# Patient Record
Sex: MALE | Race: BLACK OR AFRICAN AMERICAN | NOT HISPANIC OR LATINO | Employment: OTHER | URBAN - METROPOLITAN AREA
[De-identification: names, ages, dates, MRNs, and addresses within clinical notes are randomized per-mention and may not be internally consistent; named-entity substitution may affect disease eponyms.]

---

## 2023-08-21 PROBLEM — N20.0 KIDNEY STONES: Status: ACTIVE | Noted: 2023-08-21

## 2023-08-21 PROBLEM — M24.359 SPASTIC HIP DISLOCATION: Status: ACTIVE | Noted: 2023-08-21

## 2023-08-21 PROBLEM — G80.0 CP (CEREBRAL PALSY), SPASTIC, QUADRIPLEGIC (MULTI): Status: ACTIVE | Noted: 2023-08-21

## 2023-08-21 PROBLEM — M25.559 PAIN, HIP: Status: ACTIVE | Noted: 2023-08-21

## 2023-08-21 PROBLEM — G40.909 SEIZURE DISORDER (MULTI): Status: ACTIVE | Noted: 2023-08-21

## 2023-08-21 RX ORDER — DIAZEPAM 10 MG/2G
10 GEL RECTAL EVERY 6 HOURS PRN
COMMUNITY

## 2023-08-21 RX ORDER — VIT C/E/ZN/COPPR/LUTEIN/ZEAXAN 250MG-90MG
1000 CAPSULE ORAL
COMMUNITY
End: 2024-02-20 | Stop reason: ENTERED-IN-ERROR

## 2023-08-21 RX ORDER — ACETAMINOPHEN 650 MG/1
650 SUPPOSITORY RECTAL
COMMUNITY
End: 2024-02-20 | Stop reason: ENTERED-IN-ERROR

## 2023-08-21 RX ORDER — FOLIC ACID/MULTIVIT,IRON,MINER .4-18-35
1 TABLET,CHEWABLE ORAL DAILY
COMMUNITY
End: 2024-02-20 | Stop reason: ENTERED-IN-ERROR

## 2023-08-21 RX ORDER — PHENOBARBITAL 100 MG/1
1 TABLET ORAL 2 TIMES DAILY
COMMUNITY
Start: 2021-03-12 | End: 2023-10-24 | Stop reason: SDUPTHER

## 2023-08-21 RX ORDER — ALBUTEROL SULFATE 0.83 MG/ML
2.5 SOLUTION RESPIRATORY (INHALATION)
COMMUNITY
End: 2024-02-20 | Stop reason: ENTERED-IN-ERROR

## 2023-08-21 RX ORDER — BACLOFEN 20 MG/1
20 TABLET ORAL 4 TIMES DAILY
COMMUNITY

## 2023-08-21 RX ORDER — DOCUSATE SODIUM 50 MG/5ML
10 LIQUID ORAL 2 TIMES DAILY
COMMUNITY
Start: 2023-01-08 | End: 2024-02-20 | Stop reason: ENTERED-IN-ERROR

## 2023-08-21 RX ORDER — TRIPROLIDINE/PSEUDOEPHEDRINE 2.5MG-60MG
20 TABLET ORAL EVERY 6 HOURS PRN
COMMUNITY

## 2023-08-21 RX ORDER — BISACODYL 10 MG/1
10 SUPPOSITORY RECTAL
COMMUNITY

## 2023-08-21 RX ORDER — ALBUTEROL SULFATE 90 UG/1
2 AEROSOL, METERED RESPIRATORY (INHALATION) 4 TIMES DAILY PRN
COMMUNITY

## 2023-08-21 RX ORDER — PHENOBARBITAL 15 MG/1
15 TABLET ORAL 2 TIMES DAILY
COMMUNITY
Start: 2022-04-19 | End: 2023-10-24 | Stop reason: SDUPTHER

## 2023-08-21 RX ORDER — ACETAMINOPHEN 160 MG/5ML
160 SUSPENSION ORAL
COMMUNITY
End: 2024-02-20 | Stop reason: ENTERED-IN-ERROR

## 2023-08-21 RX ORDER — PNV NO.95/FERROUS FUM/FOLIC AC 28MG-0.8MG
TABLET ORAL
COMMUNITY
End: 2024-02-20 | Stop reason: ENTERED-IN-ERROR

## 2023-10-02 ENCOUNTER — OFFICE VISIT (OUTPATIENT)
Dept: UROLOGY | Facility: CLINIC | Age: 33
End: 2023-10-02
Payer: MEDICAID

## 2023-10-02 DIAGNOSIS — G40.909 NONINTRACTABLE EPILEPSY WITHOUT STATUS EPILEPTICUS, UNSPECIFIED EPILEPSY TYPE (MULTI): ICD-10-CM

## 2023-10-02 DIAGNOSIS — N21.0 BLADDER STONE: ICD-10-CM

## 2023-10-02 DIAGNOSIS — G80.0 CP (CEREBRAL PALSY), SPASTIC, QUADRIPLEGIC (MULTI): ICD-10-CM

## 2023-10-02 DIAGNOSIS — N20.0 NEPHROLITHIASIS: Primary | ICD-10-CM

## 2023-10-02 DIAGNOSIS — R53.2 FUNCTIONAL QUADRIPLEGIA (MULTI): ICD-10-CM

## 2023-10-02 PROCEDURE — 99214 OFFICE O/P EST MOD 30 MIN: CPT | Performed by: STUDENT IN AN ORGANIZED HEALTH CARE EDUCATION/TRAINING PROGRAM

## 2023-10-02 PROCEDURE — 1036F TOBACCO NON-USER: CPT | Performed by: STUDENT IN AN ORGANIZED HEALTH CARE EDUCATION/TRAINING PROGRAM

## 2023-10-02 NOTE — PROGRESS NOTES
Cory presents for concerns of bladder stones.  The patient’s EMR has been reviewed.  Lives in [place], [place].  Has lived in a Group Home for the last 8-9 years.  Accompanied by the Manager of the group home whom is the primary historian.   Previously evaluated by Dr. Hunt and Marii Eric.  Referred to me 2/2 concerns of forming bladder stones.     SUBJECTIVE:  HPI   Hx of cerebral palsy and nephrolithiasis, urinary incontinence and epilepsy. Daily medications include phenobarbital daily. Wears adult depends for his incontinence.     Per Caregiver/:  Reports the patient passed a small amount of gross hematuria.   Occurred about 10 days ago. Spontaneously resolved. Has not recurred since. Patient did not appear in any pain or discomfort. Denies any recent UTIs or fevers.    Last CT imaging ((May 2022) was personally reviewed showed bladder wall thickening, but no stones visualized.   Latest RBUS (05/11/23) showed possible non-obstructing stones and bladder stones.  Last Cr 0.67 (July 11, 2023)    Past medical, surgical, family and social history in the chart was reviewed and is accurate including any additions to what is in this HPI.    Review of Systems   Constitutional: denies any unintentional weight loss or change in strength.  Integumentary: denies any rashes or pruritus.  Eyes: denies any double vision or eye pain.  Ear/Nose/Mouth/Throat: denies any nosebleeds or gum bleeds.  Cardiovascular: denies any chest pain or syncope.  Respiratory: denies hemoptysis.  Gastrointestinal: denies nausea or vomiting.  Musculoskeletal: denies muscle cramping or weakness.  Neurologic: denies convulsions or seizures.  Hematologic/Lymphatic: denies bleeding tendencies.  Endocrine: denies heat/cold intolerance.  All other systems have been reviewed and are negative unless otherwise noted in the HPI.    OBJECTIVE:  There were no vitals taken for this visit.  Physical Exam   Constitutional: No obvious  distress.  Eyes: Non-injected conjunctiva, sclera clear, EOMI.  Ears/Nose/Mouth/Throat: No obvious drainage per ears or nose.  Cardiovascular: Extremities are warm and well perfused. No edema, cyanosis or pallor.  Respiratory: No audible wheezing/stridor; respirations do not appear labored.  Gastrointestinal: Abdomen soft, not distended.  Musculoskeletal: Normal ROM of extremities.  Skin: No obvious rashes or open sores.  Neurologic: Alert and oriented, CN 2-12 grossly intact.  Psychiatric: Answers questions appropriately with normal affect.  Hematologic/Lymphatic/Immunologic: No obvious bruises or sites of spontaneous bleeding.  Genitourinary: No CVA tenderness, bladder not palpable.     Labs and imaging personally reviewed:  Lab Results   Component Value Date    WBC 7.4 07/11/2023    HGB 14.4 07/11/2023    HCT 43.8 07/11/2023     07/11/2023    CHOL 187 07/20/2021    TRIG 40 07/20/2021    HDL 65.5 07/20/2021    ALT 13 07/11/2023    AST 11 07/11/2023     07/11/2023    K 4.2 07/11/2023     07/11/2023    CREATININE 0.67 07/11/2023    BUN 9 07/11/2023    CO2 33 (H) 07/11/2023    INR 1.2 (H) 12/29/2022    HGBA1C 5.6 07/11/2023     === 05/04/22 ===  CT ABDOMEN PELVIS WO IV CONTRAST  - Impression -  1. Bilateral nonobstructing renal calculi.  2. A large amount of rectal stool suggests a fecal impaction.  3. Sigmoid, rectosigmoid and rectal wall thickening may be present.  4. Generalized urinary bladder wall thickening.   === 05/11/23 ===  US RENAL COMPLETE  - Impression -  Nonobstructing right-sided nephrolithiasis measuring up to 5 mm.  Thick-walled appearance of the urinary bladder which contains  layering debris and stones.     ASSESSMENT & PLAN:  Problem List Items Addressed This Visit    None  Visit Diagnoses       Nephrolithiasis    -  Primary    Relevant Orders    CT abdomen pelvis wo IV contrast    Follow Up In Urology    Bladder stone        Relevant Orders    CT abdomen pelvis wo IV contrast     Follow Up In Urology     CT stone to assess bladder and kidney stone size and location  Per my personal review of the ultrasound there does not appear to be significant bladder stone burden, may be able to watch this. He is not having UTIs.   *Plan to RTC in a couple weeks with a CT scan prior to.   All questions were answered to the Caregiver's satisfaction.  Patient's Caregiver agrees with the plan and wishes to proceed.    Scribed for Dr. Paolo Delgadillo by Michael Lucas.  I, Dr. Paolo Delgadillo have personally reviewed and agreed with the information entered by the Virtual Scribe. 10/02/23, 3:20 PM

## 2023-10-05 ENCOUNTER — OFFICE VISIT (OUTPATIENT)
Dept: POST ACUTE CARE | Facility: EXTERNAL LOCATION | Age: 33
End: 2023-10-05
Payer: MEDICAID

## 2023-10-05 DIAGNOSIS — M24.352 SPASTIC DISLOCATION OF LEFT HIP: ICD-10-CM

## 2023-10-05 DIAGNOSIS — G80.0 CP (CEREBRAL PALSY), SPASTIC, QUADRIPLEGIC (MULTI): Primary | ICD-10-CM

## 2023-10-05 PROCEDURE — 1036F TOBACCO NON-USER: CPT | Performed by: ORTHOPAEDIC SURGERY

## 2023-10-05 PROCEDURE — 99213 OFFICE O/P EST LOW 20 MIN: CPT | Performed by: ORTHOPAEDIC SURGERY

## 2023-10-05 NOTE — PROGRESS NOTES
Cory is a 33-year-old male with a year history of cerebral palsy spastic quadriplegia who presents today as follow-up from his most recent left hip steroid injection done on February 9, 2022. He tolerated this very well and is still seeing pain relief from it. The staff reports that he has been comfortable sitting in his chair for longer (up to 2.5 hours), and tolerates both back and side-lying for longer periods of time except a recent period of discomfort on his R side with right side lying.     Getting some mods to his wheelchair soon.  Increased pain in right hip per report, although two caregivers have said he seems to be doing pretty well.  Using ibuprofen, Can tolerate about 2.5 hours in wheelchair.    Past Medical History:   Diagnosis Date    Other conditions influencing health status     Pneumonia     No past surgical history on file.    Current Outpatient Medications:     acetaminophen (Tylenol) 650 mg suppository, Insert 1 suppository (650 mg) into the rectum., Disp: , Rfl:     acetaminophen 160 mg/5 mL (5 mL) suspension, Take 5 mL (160 mg) by mouth., Disp: , Rfl:     albuterol 2.5 mg /3 mL (0.083 %) nebulizer solution, Inhale 3 mL (2.5 mg)., Disp: , Rfl:     albuterol 90 mcg/actuation inhaler, Inhale 2 puffs 4 times a day as needed., Disp: , Rfl:     baclofen (Lioresal) 20 mg tablet, Take 1 tablet (20 mg) by mouth., Disp: , Rfl:     bisacodyl (Dulcolax) 10 mg suppository, Insert 1 suppository (10 mg) into the rectum., Disp: , Rfl:     calcium carbonate-vitamin D3 (Oscal-500) 500 mg-10 mcg (400 unit) tablet, Take by mouth. [facility] Calcium 500+D 500-400mg, Disp: , Rfl:     cholecalciferol (Vitamin D-3) 25 MCG (1000 UT) capsule, Take 1 capsule (25 mcg) by mouth., Disp: , Rfl:     diazePAM (Diastat AcuDiaL) 5-7.5-10 mg rectal kit, Insert into the rectum. 5-7.5-10mg rectal kit, Disp: , Rfl:     docusate sodium (Colace) 50 mg/5 mL oral liquid, Take 10 mL (100 mg) by mouth 2 times a day., Disp: , Rfl:      ibuprofen 100 mg/5 mL suspension, Take 5 mL (100 mg) by mouth., Disp: , Rfl:     multivit-min-iron-folic-vit K1 (Centrum Chewables) 8 mg-400 mcg- 10 mcg tablet,chewable, 1 tablet by peg tube route once daily., Disp: , Rfl:     multivitamin/iron/folic acid (CENTRUM ORAL), Take by mouth. LIQUID, Disp: , Rfl:     omeprazole (PriLOSEC) 2 mg/mL solution, Take 1 mL (2 mg) by mouth., Disp: , Rfl:     PHENobarbitaL (Luminal) 100 mg tablet, Take 1 tablet (100 mg) by mouth 2 times a day., Disp: , Rfl:     PHENobarbitaL (Luminal) 15 mg tablet, Take 1 tablet (15 mg) by mouth 2 times a day. Take one tablet twice daily along with 100mg BID, Disp: , Rfl:     polyethylene glycol 3350 (MIRALAX ORAL), 15 mL by peg tube route twice a day., Disp: , Rfl:     sodium chloride (Ayr) 0.65 % nasal drops, Administer into affected nostril(s). 0.65%SOLN, Disp: , Rfl:   No Known Allergies  Family History   Problem Relation Name Age of Onset    No Known Problems Mother       Social History     Socioeconomic History    Marital status: Single     Spouse name: Not on file    Number of children: Not on file    Years of education: Not on file    Highest education level: Not on file   Occupational History    Not on file   Tobacco Use    Smoking status: Never    Smokeless tobacco: Never   Substance and Sexual Activity    Alcohol use: Not on file    Drug use: Not on file    Sexual activity: Not on file   Other Topics Concern    Not on file   Social History Narrative    Not on file     Social Determinants of Health     Financial Resource Strain: Not on file   Food Insecurity: Not on file   Transportation Needs: Not on file   Physical Activity: Not on file   Stress: Not on file   Social Connections: Not on file   Intimate Partner Violence: Not on file   Housing Stability: Not on file       Review of Systems: 16 systems reviewed with the patient and family.  All systems were negative except for the pertinent positives noted in the history above.    General:  Well developed, well nourished male in no acute distress.    Skin: The skin is intact with no evidence of abrasions, bruises, or swelling.    Vascular: There are 2+ pulses in both lower extremities and brisk capillary refill.    Neuro: The light touch sensation is intact in both legs.    The left hip can be moved into some minimal abduction - only about 10 total degrees (more on the right) and he seems to have no pain. He has almost no hip flexion on the left, but can be fully extended. He also allows adduction, IR and ER on both sides, with no obvious pain today. His knees and ankles are stiff with very limited mobility. His spine is straight, I could abduct his shoulders to neutral and he kept his elbows pretty tightly held in some flexion. He was concerned about moving his arms today.  His spine is straight.     Assesment: Cory Cobian is a 33 y.o. male who has CPSQ and a history of hip arthritis.  At this time we will continue with observation. The hip steroid injection still seems to be effective. We can repeat it at anytime in the future, but we will plan for one year follow up with no Xrays at this time.

## 2023-10-24 ENCOUNTER — LAB (OUTPATIENT)
Dept: LAB | Facility: LAB | Age: 33
End: 2023-10-24
Payer: MEDICAID

## 2023-10-24 ENCOUNTER — OFFICE VISIT (OUTPATIENT)
Dept: NEUROLOGY | Facility: CLINIC | Age: 33
End: 2023-10-24
Payer: MEDICAID

## 2023-10-24 VITALS — HEART RATE: 108 BPM | DIASTOLIC BLOOD PRESSURE: 84 MMHG | RESPIRATION RATE: 18 BRPM | SYSTOLIC BLOOD PRESSURE: 133 MMHG

## 2023-10-24 DIAGNOSIS — G40.909 SEIZURE DISORDER (MULTI): ICD-10-CM

## 2023-10-24 DIAGNOSIS — G40.909 SEIZURE DISORDER (MULTI): Primary | ICD-10-CM

## 2023-10-24 LAB — PHENOBARB SERPL-MCNC: 46 UG/ML (ref 10–40)

## 2023-10-24 PROCEDURE — 80184 ASSAY OF PHENOBARBITAL: CPT

## 2023-10-24 PROCEDURE — 1036F TOBACCO NON-USER: CPT | Performed by: NURSE PRACTITIONER

## 2023-10-24 PROCEDURE — 36415 COLL VENOUS BLD VENIPUNCTURE: CPT

## 2023-10-24 PROCEDURE — 99214 OFFICE O/P EST MOD 30 MIN: CPT | Performed by: NURSE PRACTITIONER

## 2023-10-24 PROCEDURE — 99214 OFFICE O/P EST MOD 30 MIN: CPT | Mod: PO | Performed by: NURSE PRACTITIONER

## 2023-10-24 RX ORDER — PHENOBARBITAL 100 MG/1
100 TABLET ORAL 2 TIMES DAILY
Qty: 60 TABLET | Refills: 5 | Status: SHIPPED | OUTPATIENT
Start: 2023-10-24 | End: 2024-04-21

## 2023-10-24 RX ORDER — PHENOBARBITAL 15 MG/1
15 TABLET ORAL 2 TIMES DAILY
Qty: 60 TABLET | Refills: 5 | Status: SHIPPED | OUTPATIENT
Start: 2023-10-24 | End: 2024-04-21

## 2023-10-24 ASSESSMENT — PAIN SCALES - GENERAL: PAINLEVEL: 0-NO PAIN

## 2023-10-24 NOTE — PATIENT INSTRUCTIONS
"Thank you for coming to the Ep[facility]psy Clinic today.  -If you have any sudden new, concerning or worsening symptoms, call 911 and go to the Emergency Room. Otherwise, it was good seeing you today-    -Please follow seizure precautions:   Please do not drive, operate any heavy machinery, swim unsupervised, please shower without collection of water instead of bathe. Be cautious around hot, heavy, or sharp objects. Do not cook with an open flame and do not perform any activities at heights such as on a ladder. These precautions should stay in place until 6 months seizure free and cleared by a provider.    -HOW TO CONTACT JOHNNY LOPEZ EPILEPSY NURSE PRACTITIONER ([phone]).   Instructed to call in the event of seizure, medication refills, or any questions  *Please allow 24-48 hours for non-urgent responses*.  For emergency concerns, please dial 911 or present to the nearest emergency room.  For concerns after business hours (8am-4:30pm) or on weekends please call [phone]  To call and schedule a follow up appointment please call [phone]  -Paperwork may take up to 3 business days to complete-    Every attempt is made to run on time for your appointment, if you are 15 minutes or later for your appoinement you may be asked to reschedule    -Compliance education: It is important to continue to try and achieve seizure control because of the potential for injury and illness due to seizures. In a very small minority of patients with generalized tonic clonic seizures (\"grand mal\"), breathing or heart function can stop during a seizure and result in demise (sudden unexpected death in epilepsy or SUDEP). [place] from seizures prevents this kind of outcome-     I have ordered blood work for you to have completed at the lab. You do not need to bring any paperwork with you if you are going to a [facility]. The results will automatically come to me and I will call or message you with results.        "

## 2023-10-24 NOTE — ASSESSMENT & PLAN NOTE
Continue PB 100mg(97.2) + 15mg twice daily   PB level q6 months- recurring order in system   RTC 12 months

## 2023-10-24 NOTE — PROGRESS NOTES
Patient ID: Cory Cobian 33 y.o.male presenting in follow-up for epilepsy. severe cognitive impairment due to cerebral palsy, spastic quadriplegia, seizure disorders who presented to our clinic today for 6 month follow-up.     HPI    Seizure History:  In March he had a cluster of abnormal movements (bilateral leg shakings) in one day concerning for seizures even though it was not his typical ones. He was brought to hospital and was given Diastat, Keppra on route.   We monitored him 24 hrs and there were no epileptiform discharges or seizures. He was discharged and we increased Phenobarbital from 100/100 to 113.4/113.4 mg (liquid formation) mainly for precautions as his events were not really sure for epileptic phenomenon.   In April, he had total 6 seizures, one of them lasted more than 30 minutes so Diastat was given.   In May, until now he already had 3 seizures, the first and second one lasted 10 to 15 minutes and the last one few days ago lasted about 30s.   The nurse didn't witness all his seizures, mentioning that most of them were his typical ones but some of them were just some regular shaking like behavior issues.   Overall, his seizures/events are more frequent than last year and the year before.   There were no clear trigger factors like fevers, infections, change of medications, sleep deprive noticed.            PRESENT CONCERNS:    He is accompanied by a health aide from his group home. Since his last visit he has had no seizures. His last seizures were in July 2021. Prior seizures were described as RUE shaking for 30 seconds- 1 minute. He has not needed any rescue diastat. Overall he is tolerating the increased dose of PB 115mg bid.. There are no reports of staring spells, memory lapses, waking up in the morning with bite marks on tongue or cheek, or unexplained bruises, or unexplained urinary incontinence upon awakening. Had covid in January but recovering well.      RECENT PB LEVELS:  01/2023:  45.5  4/2023: 38.8      Review of Systems   All other systems reviewed and are negative.          RESULTS:  No EEG results found for the past 12 months    No results found for this or any previous visit (from the past 4464 hour(s)).        No MRI head results found for the past 12 months    No CT head results found for the past 12 months    No results found for this or any previous visit (from the past 4464 hour(s)).    No results found for this or any previous visit.        Vitals:    10/24/23 1052   BP: 133/84   Pulse: 108   Resp: 18       Neurologic Exam   non verbal   limited exam due to severe cognitive impairment and spastic quadriplegia   wheel chair bound        ASSESSMENT & PLAN:   33 y.o. male presenting in follow-up for peviously diagnosed epilepsy. Semiology as described above    Problem List Items Addressed This Visit       Seizure disorder (CMS/[facility]) - Primary     Continue PB 100mg(97.2) + 15mg twice daily   PB level q6 months- recurring order in system   RTC 12 months            Relevant Medications    PHENobarbitaL (Luminal) 100 mg tablet    PHENobarbitaL (Luminal) 15 mg tablet    Other Relevant Orders    Phenobarbital

## 2023-11-02 ENCOUNTER — HOSPITAL ENCOUNTER (OUTPATIENT)
Dept: RADIOLOGY | Facility: HOSPITAL | Age: 33
Discharge: HOME | End: 2023-11-02
Payer: MEDICAID

## 2023-11-02 DIAGNOSIS — N20.0 NEPHROLITHIASIS: ICD-10-CM

## 2023-11-02 DIAGNOSIS — N21.0 BLADDER STONE: ICD-10-CM

## 2023-11-02 PROCEDURE — 74176 CT ABD & PELVIS W/O CONTRAST: CPT

## 2023-11-02 PROCEDURE — 74176 CT ABD & PELVIS W/O CONTRAST: CPT | Performed by: STUDENT IN AN ORGANIZED HEALTH CARE EDUCATION/TRAINING PROGRAM

## 2023-12-05 ENCOUNTER — OFFICE VISIT (OUTPATIENT)
Dept: UROLOGY | Facility: CLINIC | Age: 33
End: 2023-12-05
Payer: MEDICAID

## 2023-12-05 VITALS — TEMPERATURE: 97.2 F | WEIGHT: 97 LBS | BODY MASS INDEX: 18.94 KG/M2

## 2023-12-05 DIAGNOSIS — N20.0 KIDNEY STONES: Primary | ICD-10-CM

## 2023-12-05 PROCEDURE — 99214 OFFICE O/P EST MOD 30 MIN: CPT | Performed by: STUDENT IN AN ORGANIZED HEALTH CARE EDUCATION/TRAINING PROGRAM

## 2023-12-05 PROCEDURE — 1036F TOBACCO NON-USER: CPT | Performed by: STUDENT IN AN ORGANIZED HEALTH CARE EDUCATION/TRAINING PROGRAM

## 2023-12-05 NOTE — PROGRESS NOTES
Cory presents for a follow up visit and CT results.  The patient’s EMR has been reviewed.  Lives in [place], [place].  Has lived in a Group Home for the last 8-9 years.  Accompanied by the Manager of the group home whom is the primary historian.   Previously evaluated by Dr. Hunt and Marii Eric.  Referred to me 2/2 concerns of forming bladder stones.     Hx of cerebral palsy and nephrolithiasis, urinary incontinence and epilepsy.   Daily medications include phenobarbital daily.   Wears adult depends for his incontinence.     Recent CT A&P personally reviewed.  1. Multiple bilateral nonobstructing renal calculi measuring up to 8  mm in the left inferior pole. No hydronephrosis.  2. Rectal dilation and extensive colonic stool.    SUBJECTIVE: HPI   TODAY (12/05/23)  Per Caregiver/.  Patient has been doing well overall.  No acute or worsening complaints.   He does not appear to be in any discomfort.   Latest CT results reviewed with Caregiver today.     TO REVIEW: Initial evaluation (10/02/23)  Per Caregiver/:  Reports the patient passed a small amount of gross hematuria.   Occurred about 10 days ago.   Spontaneously resolved.   Has not recurred since.   Patient did not appear in any pain or discomfort.   Denies any recent UTIs or fevers.     Last CT imaging ((May 2022) was personally reviewed showed:  - bladder wall thickening, but no stones visualized.     Latest RBUS (05/11/23) showed:  - possible non-obstructing stones and bladder stones.    Last Cr 0.67 (July 11, 2023)    Past medical, surgical, family and social history in the chart was reviewed and is accurate including any additions to what is in this HPI.    Review of Systems   Constitutional: denies any unintentional weight loss or change in strength.  Integumentary: denies any rashes or pruritus.  Eyes: denies any double vision or eye pain.  Ear/Nose/Mouth/Throat: denies any nosebleeds or gum bleeds.  Cardiovascular:  denies any chest pain or syncope.  Respiratory: denies hemoptysis.  Gastrointestinal: denies nausea or vomiting.  Musculoskeletal: denies muscle cramping or weakness.  Neurologic: denies convulsions or seizures.  Hematologic/Lymphatic: denies bleeding tendencies.  Endocrine: denies heat/cold intolerance.  All other systems have been reviewed and are negative unless otherwise noted in the HPI.    OBJECTIVE:  Visit Vitals  Temp 36.2 °C (97.2 °F)     Physical Exam   Constitutional: No obvious distress.  Eyes: Non-injected conjunctiva, sclera clear, EOMI.  Ears/Nose/Mouth/Throat: No obvious drainage per ears or nose.  Cardiovascular: Extremities are warm and well perfused. No edema, cyanosis or pallor.  Respiratory: No audible wheezing/stridor; respirations do not appear labored.  Gastrointestinal: Abdomen soft, not distended.  Musculoskeletal: Normal ROM of extremities.  Skin: No obvious rashes or open sores.  Neurologic: Alert and oriented, CN 2-12 grossly intact.  Psychiatric: Answers questions appropriately with normal affect.  Hematologic/Lymphatic/Immunologic: No obvious bruises or sites of spontaneous bleeding.  Genitourinary: No CVA tenderness, bladder not palpable.     Labs and imaging:  Lab Results   Component Value Date    WBC 7.4 07/11/2023    HGB 14.4 07/11/2023    HCT 43.8 07/11/2023     07/11/2023    CHOL 187 07/20/2021    TRIG 40 07/20/2021    HDL 65.5 07/20/2021    ALT 13 07/11/2023    AST 11 07/11/2023     07/11/2023    K 4.2 07/11/2023     07/11/2023    CREATININE 0.67 07/11/2023    BUN 9 07/11/2023    CO2 33 (H) 07/11/2023    INR 1.2 (H) 12/29/2022    HGBA1C 5.6 07/11/2023     ASSESSMENT:  Problem List Items Addressed This Visit    None     PLAN:  CT results reviewed with Caregiver.   Current renal stones may be too large to pass.   Suspect he will require surgical intervention eventually for removal.   Discussed options, will continue with stone surveillance for now.  Advised to  call if he becomes acutely symptomatic.   RTC in 3 months with a KUB prior to.     Patient appears to be significantly constipated and impacted.  Advised that this needs to be addressed prior to being considered for surgery.   Encouraged to follow up with PCP for this.     All questions were answered to the patient’s satisfaction.  Patient agrees with the plan and wishes to proceed.    Scribed for Dr. Paolo Delgadillo by Michael Lucas.  I, Dr. Paolo Delgadillo have personally reviewed and agreed with the information entered by the Virtual Scribe. 12/05/23.

## 2023-12-14 ENCOUNTER — HOSPITAL ENCOUNTER (OUTPATIENT)
Dept: RADIOLOGY | Facility: HOSPITAL | Age: 33
Discharge: HOME | End: 2023-12-14
Payer: MEDICAID

## 2023-12-14 DIAGNOSIS — N20.0 KIDNEY STONES: ICD-10-CM

## 2023-12-14 PROCEDURE — 74018 RADEX ABDOMEN 1 VIEW: CPT

## 2023-12-14 PROCEDURE — 74018 RADEX ABDOMEN 1 VIEW: CPT | Performed by: RADIOLOGY

## 2024-02-06 ENCOUNTER — LAB REQUISITION (OUTPATIENT)
Dept: LAB | Facility: HOSPITAL | Age: 34
End: 2024-02-06
Payer: MEDICAID

## 2024-02-06 DIAGNOSIS — Z79.899 OTHER LONG TERM (CURRENT) DRUG THERAPY: ICD-10-CM

## 2024-02-06 DIAGNOSIS — Z51.81 ENCOUNTER FOR THERAPEUTIC DRUG LEVEL MONITORING: ICD-10-CM

## 2024-02-06 DIAGNOSIS — E55.9 VITAMIN D DEFICIENCY, UNSPECIFIED: ICD-10-CM

## 2024-02-06 LAB
ALBUMIN SERPL BCP-MCNC: 4 G/DL (ref 3.4–5)
ALP SERPL-CCNC: 108 U/L (ref 33–120)
ALT SERPL W P-5'-P-CCNC: 13 U/L (ref 10–52)
AST SERPL W P-5'-P-CCNC: 11 U/L (ref 9–39)
BILIRUB DIRECT SERPL-MCNC: 0.1 MG/DL (ref 0–0.3)
BILIRUB SERPL-MCNC: 0.3 MG/DL (ref 0–1.2)
EST. AVERAGE GLUCOSE BLD GHB EST-MCNC: 111 MG/DL
HBA1C MFR BLD: 5.5 %
PHENOBARB SERPL-MCNC: 36.2 UG/ML (ref 10–40)
PROT SERPL-MCNC: 7.1 G/DL (ref 6.4–8.2)

## 2024-02-06 PROCEDURE — 80184 ASSAY OF PHENOBARBITAL: CPT | Mod: OUT | Performed by: HOSPITALIST

## 2024-02-06 PROCEDURE — 83036 HEMOGLOBIN GLYCOSYLATED A1C: CPT | Mod: OUT | Performed by: HOSPITALIST

## 2024-02-06 PROCEDURE — 80076 HEPATIC FUNCTION PANEL: CPT | Mod: OUT | Performed by: HOSPITALIST

## 2024-02-20 ENCOUNTER — HOSPITAL ENCOUNTER (INPATIENT)
Facility: HOSPITAL | Age: 34
LOS: 2 days | Discharge: HOME | End: 2024-02-22
Attending: GENERAL PRACTICE | Admitting: FAMILY MEDICINE
Payer: MEDICAID

## 2024-02-20 ENCOUNTER — APPOINTMENT (OUTPATIENT)
Dept: RADIOLOGY | Facility: HOSPITAL | Age: 34
End: 2024-02-20
Payer: MEDICAID

## 2024-02-20 ENCOUNTER — APPOINTMENT (OUTPATIENT)
Dept: CARDIOLOGY | Facility: HOSPITAL | Age: 34
End: 2024-02-20
Payer: MEDICAID

## 2024-02-20 DIAGNOSIS — J10.1 INFLUENZA A: Primary | ICD-10-CM

## 2024-02-20 LAB
ALBUMIN SERPL BCP-MCNC: 4.6 G/DL (ref 3.4–5)
ALP SERPL-CCNC: 128 U/L (ref 33–120)
ALT SERPL W P-5'-P-CCNC: 19 U/L (ref 10–52)
AMORPH CRY #/AREA UR COMP ASSIST: ABNORMAL /HPF
ANION GAP SERPL CALC-SCNC: 12 MMOL/L (ref 10–20)
APPEARANCE UR: ABNORMAL
AST SERPL W P-5'-P-CCNC: 16 U/L (ref 9–39)
BACTERIA #/AREA URNS AUTO: ABNORMAL /HPF
BASOPHILS # BLD AUTO: 0.04 X10*3/UL (ref 0–0.1)
BASOPHILS NFR BLD AUTO: 0.5 %
BILIRUB SERPL-MCNC: 0.2 MG/DL (ref 0–1.2)
BILIRUB UR STRIP.AUTO-MCNC: NEGATIVE MG/DL
BUN SERPL-MCNC: 7 MG/DL (ref 6–23)
CALCIUM SERPL-MCNC: 9.1 MG/DL (ref 8.6–10.3)
CHLORIDE SERPL-SCNC: 99 MMOL/L (ref 98–107)
CO2 SERPL-SCNC: 31 MMOL/L (ref 21–32)
COLOR UR: YELLOW
CREAT SERPL-MCNC: 0.64 MG/DL (ref 0.5–1.3)
EGFRCR SERPLBLD CKD-EPI 2021: >90 ML/MIN/1.73M*2
EOSINOPHIL # BLD AUTO: 0.12 X10*3/UL (ref 0–0.7)
EOSINOPHIL NFR BLD AUTO: 1.4 %
ERYTHROCYTE [DISTWIDTH] IN BLOOD BY AUTOMATED COUNT: 12.3 % (ref 11.5–14.5)
FLUAV RNA RESP QL NAA+PROBE: DETECTED
FLUBV RNA RESP QL NAA+PROBE: NOT DETECTED
GLUCOSE SERPL-MCNC: 115 MG/DL (ref 74–99)
GLUCOSE UR STRIP.AUTO-MCNC: NEGATIVE MG/DL
HCT VFR BLD AUTO: 46.3 % (ref 41–52)
HGB BLD-MCNC: 15.3 G/DL (ref 13.5–17.5)
IMM GRANULOCYTES # BLD AUTO: 0.04 X10*3/UL (ref 0–0.7)
IMM GRANULOCYTES NFR BLD AUTO: 0.5 % (ref 0–0.9)
KETONES UR STRIP.AUTO-MCNC: NEGATIVE MG/DL
LACTATE SERPL-SCNC: 1.6 MMOL/L (ref 0.4–2)
LACTATE SERPL-SCNC: 2.5 MMOL/L (ref 0.4–2)
LEUKOCYTE ESTERASE UR QL STRIP.AUTO: NEGATIVE
LYMPHOCYTES # BLD AUTO: 0.99 X10*3/UL (ref 1.2–4.8)
LYMPHOCYTES NFR BLD AUTO: 11.2 %
MCH RBC QN AUTO: 31.7 PG (ref 26–34)
MCHC RBC AUTO-ENTMCNC: 33 G/DL (ref 32–36)
MCV RBC AUTO: 96 FL (ref 80–100)
MONOCYTES # BLD AUTO: 1.14 X10*3/UL (ref 0.1–1)
MONOCYTES NFR BLD AUTO: 12.9 %
MUCOUS THREADS #/AREA URNS AUTO: ABNORMAL /LPF
NEUTROPHILS # BLD AUTO: 6.54 X10*3/UL (ref 1.2–7.7)
NEUTROPHILS NFR BLD AUTO: 73.5 %
NITRITE UR QL STRIP.AUTO: NEGATIVE
NRBC BLD-RTO: 0 /100 WBCS (ref 0–0)
PH UR STRIP.AUTO: 7 [PH]
PLATELET # BLD AUTO: 272 X10*3/UL (ref 150–450)
POTASSIUM SERPL-SCNC: 3.7 MMOL/L (ref 3.5–5.3)
PROT SERPL-MCNC: 8 G/DL (ref 6.4–8.2)
PROT UR STRIP.AUTO-MCNC: ABNORMAL MG/DL
RBC # BLD AUTO: 4.83 X10*6/UL (ref 4.5–5.9)
RBC # UR STRIP.AUTO: ABNORMAL /UL
RBC #/AREA URNS AUTO: >20 /HPF
SARS-COV-2 RNA RESP QL NAA+PROBE: NOT DETECTED
SODIUM SERPL-SCNC: 138 MMOL/L (ref 136–145)
SP GR UR STRIP.AUTO: 1.01
UROBILINOGEN UR STRIP.AUTO-MCNC: <2 MG/DL
WBC # BLD AUTO: 8.9 X10*3/UL (ref 4.4–11.3)
WBC #/AREA URNS AUTO: ABNORMAL /HPF

## 2024-02-20 PROCEDURE — 87636 SARSCOV2 & INF A&B AMP PRB: CPT | Performed by: GENERAL PRACTICE

## 2024-02-20 PROCEDURE — 71046 X-RAY EXAM CHEST 2 VIEWS: CPT | Mod: FOREIGN READ | Performed by: RADIOLOGY

## 2024-02-20 PROCEDURE — 93005 ELECTROCARDIOGRAM TRACING: CPT

## 2024-02-20 PROCEDURE — 85025 COMPLETE CBC W/AUTO DIFF WBC: CPT | Performed by: GENERAL PRACTICE

## 2024-02-20 PROCEDURE — 1100000001 HC PRIVATE ROOM DAILY

## 2024-02-20 PROCEDURE — 81001 URINALYSIS AUTO W/SCOPE: CPT | Performed by: GENERAL PRACTICE

## 2024-02-20 PROCEDURE — 96360 HYDRATION IV INFUSION INIT: CPT

## 2024-02-20 PROCEDURE — 2500000004 HC RX 250 GENERAL PHARMACY W/ HCPCS (ALT 636 FOR OP/ED): Performed by: GENERAL PRACTICE

## 2024-02-20 PROCEDURE — 71046 X-RAY EXAM CHEST 2 VIEWS: CPT

## 2024-02-20 PROCEDURE — 36415 COLL VENOUS BLD VENIPUNCTURE: CPT | Performed by: GENERAL PRACTICE

## 2024-02-20 PROCEDURE — 2500000001 HC RX 250 WO HCPCS SELF ADMINISTERED DRUGS (ALT 637 FOR MEDICARE OP): Performed by: GENERAL PRACTICE

## 2024-02-20 PROCEDURE — 2500000001 HC RX 250 WO HCPCS SELF ADMINISTERED DRUGS (ALT 637 FOR MEDICARE OP): Performed by: FAMILY MEDICINE

## 2024-02-20 PROCEDURE — 2500000004 HC RX 250 GENERAL PHARMACY W/ HCPCS (ALT 636 FOR OP/ED): Performed by: FAMILY MEDICINE

## 2024-02-20 PROCEDURE — 83605 ASSAY OF LACTIC ACID: CPT | Performed by: GENERAL PRACTICE

## 2024-02-20 PROCEDURE — 99285 EMERGENCY DEPT VISIT HI MDM: CPT | Mod: 25

## 2024-02-20 PROCEDURE — 80053 COMPREHEN METABOLIC PANEL: CPT | Performed by: GENERAL PRACTICE

## 2024-02-20 PROCEDURE — 2500000002 HC RX 250 W HCPCS SELF ADMINISTERED DRUGS (ALT 637 FOR MEDICARE OP, ALT 636 FOR OP/ED): Performed by: FAMILY MEDICINE

## 2024-02-20 RX ORDER — FERROUS SULFATE, DRIED 160(50) MG
1 TABLET, EXTENDED RELEASE ORAL DAILY
COMMUNITY

## 2024-02-20 RX ORDER — ALBUTEROL SULFATE 90 UG/1
2 AEROSOL, METERED RESPIRATORY (INHALATION) 4 TIMES DAILY PRN
Status: DISCONTINUED | OUTPATIENT
Start: 2024-02-20 | End: 2024-02-20 | Stop reason: CLARIF

## 2024-02-20 RX ORDER — TRIPROLIDINE/PSEUDOEPHEDRINE 2.5MG-60MG
400 TABLET ORAL EVERY 6 HOURS PRN
Status: DISCONTINUED | OUTPATIENT
Start: 2024-02-20 | End: 2024-02-22 | Stop reason: HOSPADM

## 2024-02-20 RX ORDER — SODIUM CHLORIDE, SODIUM LACTATE, POTASSIUM CHLORIDE, CALCIUM CHLORIDE 600; 310; 30; 20 MG/100ML; MG/100ML; MG/100ML; MG/100ML
100 INJECTION, SOLUTION INTRAVENOUS CONTINUOUS
Status: ACTIVE | OUTPATIENT
Start: 2024-02-20 | End: 2024-02-21

## 2024-02-20 RX ORDER — BISACODYL 10 MG/1
10 SUPPOSITORY RECTAL DAILY
Status: DISCONTINUED | OUTPATIENT
Start: 2024-02-20 | End: 2024-02-22 | Stop reason: HOSPADM

## 2024-02-20 RX ORDER — BACLOFEN 10 MG/1
20 TABLET ORAL 4 TIMES DAILY
Status: DISCONTINUED | OUTPATIENT
Start: 2024-02-20 | End: 2024-02-22 | Stop reason: HOSPADM

## 2024-02-20 RX ORDER — PHENOBARBITAL 32.4 MG/1
100 TABLET ORAL 2 TIMES DAILY
Status: DISCONTINUED | OUTPATIENT
Start: 2024-02-20 | End: 2024-02-20

## 2024-02-20 RX ORDER — FAMOTIDINE 20 MG/1
20 TABLET, FILM COATED ORAL 2 TIMES DAILY
Status: DISCONTINUED | OUTPATIENT
Start: 2024-02-20 | End: 2024-02-22 | Stop reason: HOSPADM

## 2024-02-20 RX ORDER — FAMOTIDINE 10 MG/ML
20 INJECTION INTRAVENOUS 2 TIMES DAILY
Status: DISCONTINUED | OUTPATIENT
Start: 2024-02-20 | End: 2024-02-22 | Stop reason: HOSPADM

## 2024-02-20 RX ORDER — PHENOBARBITAL 32.4 MG/1
115 TABLET ORAL 2 TIMES DAILY
Status: DISCONTINUED | OUTPATIENT
Start: 2024-02-21 | End: 2024-02-22 | Stop reason: HOSPADM

## 2024-02-20 RX ORDER — POLYETHYLENE GLYCOL 3350 17 G/17G
17 POWDER, FOR SOLUTION ORAL DAILY
Status: DISCONTINUED | OUTPATIENT
Start: 2024-02-20 | End: 2024-02-22 | Stop reason: HOSPADM

## 2024-02-20 RX ORDER — PHENOBARBITAL 32.4 MG/1
15 TABLET ORAL 2 TIMES DAILY
Status: DISCONTINUED | OUTPATIENT
Start: 2024-02-20 | End: 2024-02-20

## 2024-02-20 RX ORDER — FERROUS SULFATE, DRIED 160(50) MG
1 TABLET, EXTENDED RELEASE ORAL DAILY
Status: DISCONTINUED | OUTPATIENT
Start: 2024-02-20 | End: 2024-02-22 | Stop reason: HOSPADM

## 2024-02-20 RX ORDER — ACETAMINOPHEN 325 MG/1
650 TABLET ORAL EVERY 4 HOURS PRN
COMMUNITY

## 2024-02-20 RX ORDER — MULTIVIT-MIN/FERROUS GLUCONATE 9 MG/15 ML
15 LIQUID (ML) ORAL DAILY
Status: DISCONTINUED | OUTPATIENT
Start: 2024-02-21 | End: 2024-02-22 | Stop reason: HOSPADM

## 2024-02-20 RX ORDER — DIAZEPAM 10 MG/2G
10 GEL RECTAL EVERY 6 HOURS PRN
Status: DISCONTINUED | OUTPATIENT
Start: 2024-02-20 | End: 2024-02-20

## 2024-02-20 RX ORDER — SIMETHICONE 80 MG
125 TABLET,CHEWABLE ORAL EVERY 6 HOURS PRN
Status: DISCONTINUED | OUTPATIENT
Start: 2024-02-20 | End: 2024-02-22 | Stop reason: HOSPADM

## 2024-02-20 RX ORDER — ENOXAPARIN SODIUM 100 MG/ML
40 INJECTION SUBCUTANEOUS EVERY 24 HOURS
Status: DISCONTINUED | OUTPATIENT
Start: 2024-02-20 | End: 2024-02-22 | Stop reason: HOSPADM

## 2024-02-20 RX ORDER — ACETAMINOPHEN 160 MG/5ML
650 SOLUTION ORAL ONCE
Status: COMPLETED | OUTPATIENT
Start: 2024-02-20 | End: 2024-02-20

## 2024-02-20 RX ORDER — ALBUTEROL SULFATE 0.83 MG/ML
2.5 SOLUTION RESPIRATORY (INHALATION) EVERY 6 HOURS PRN
Status: DISCONTINUED | OUTPATIENT
Start: 2024-02-20 | End: 2024-02-22 | Stop reason: HOSPADM

## 2024-02-20 RX ORDER — SIMETHICONE 125 MG
1 TABLET,CHEWABLE ORAL EVERY 6 HOURS PRN
COMMUNITY

## 2024-02-20 RX ORDER — ACETAMINOPHEN 325 MG/1
650 TABLET ORAL EVERY 4 HOURS PRN
Status: DISCONTINUED | OUTPATIENT
Start: 2024-02-20 | End: 2024-02-22 | Stop reason: HOSPADM

## 2024-02-20 RX ORDER — OSELTAMIVIR PHOSPHATE 75 MG/1
75 CAPSULE ORAL 2 TIMES DAILY
Status: DISCONTINUED | OUTPATIENT
Start: 2024-02-20 | End: 2024-02-22 | Stop reason: HOSPADM

## 2024-02-20 RX ORDER — GUAIFENESIN 600 MG/1
600 TABLET, EXTENDED RELEASE ORAL EVERY 12 HOURS PRN
Status: DISCONTINUED | OUTPATIENT
Start: 2024-02-20 | End: 2024-02-22 | Stop reason: HOSPADM

## 2024-02-20 RX ADMIN — PHENOBARBITAL 97.2 MG: 32.4 TABLET ORAL at 20:09

## 2024-02-20 RX ADMIN — BISACODYL 10 MG: 10 SUPPOSITORY RECTAL at 20:18

## 2024-02-20 RX ADMIN — ACETAMINOPHEN 650 MG: 650 SOLUTION ORAL at 09:09

## 2024-02-20 RX ADMIN — IBUPROFEN 400 MG: 100 SUSPENSION ORAL at 20:18

## 2024-02-20 RX ADMIN — DIMETHICONE 120 MG: 80 TABLET, CHEWABLE ORAL at 20:13

## 2024-02-20 RX ADMIN — BACLOFEN 20 MG: 10 TABLET ORAL at 20:10

## 2024-02-20 RX ADMIN — FAMOTIDINE 20 MG: 10 INJECTION, SOLUTION INTRAVENOUS at 20:50

## 2024-02-20 RX ADMIN — Medication 1 TABLET: at 20:19

## 2024-02-20 RX ADMIN — PHENOBARBITAL 16.2 MG: 32.4 TABLET ORAL at 20:10

## 2024-02-20 RX ADMIN — OSELTAMAVIR PHOSPHATE 75 MG: 75 CAPSULE ORAL at 20:10

## 2024-02-20 RX ADMIN — POLYETHYLENE GLYCOL (3350) 17 G: 17 POWDER, FOR SOLUTION ORAL at 19:20

## 2024-02-20 RX ADMIN — ENOXAPARIN SODIUM 40 MG: 40 INJECTION SUBCUTANEOUS at 20:50

## 2024-02-20 RX ADMIN — SODIUM CHLORIDE, POTASSIUM CHLORIDE, SODIUM LACTATE AND CALCIUM CHLORIDE 100 ML/HR: 600; 310; 30; 20 INJECTION, SOLUTION INTRAVENOUS at 20:50

## 2024-02-20 RX ADMIN — SODIUM CHLORIDE, POTASSIUM CHLORIDE, SODIUM LACTATE AND CALCIUM CHLORIDE 1000 ML: 600; 310; 30; 20 INJECTION, SOLUTION INTRAVENOUS at 08:11

## 2024-02-20 ASSESSMENT — ACTIVITIES OF DAILY LIVING (ADL): LACK_OF_TRANSPORTATION: NO

## 2024-02-20 NOTE — PROGRESS NOTES
02/20/24 1426   [facility] Disability Status   Are you deaf or do you have serious difficulty hearing? N   Are you blind or do you have serious difficulty seeing, even when wearing glasses? N   Because of a physical, mental, or emotional condition, do you have serious difficulty concentrating, remembering, or making decisions? (5 years old or older) Y  (C.P.)   Do you have serious difficulty walking or climbing stairs? Y  (WC bound)   Do you have serious difficulty dressing or bathing? Y   Because of a physical, mental, or emotional condition, do you have serious difficulty doing errands alone such as visiting the doctor? Y

## 2024-02-20 NOTE — PROGRESS NOTES
Prior to Admission Medications   Prescriptions Last Dose Informant   PHENobarbitaL (Luminal) 100 mg tablet Unknown Other   Sig: Take 1 tablet (100 mg) by mouth 2 times a day.   PHENobarbitaL (Luminal) 15 mg tablet Unknown Other   Sig: Take 1 tablet (15 mg) by mouth 2 times a day. Take one tablet twice daily along with 100mg BID   acetaminophen (Tylenol) 325 mg tablet Unknown Other   Si tablets (650 mg) by g-tube route every 4 hours if needed for mild pain (1 - 3).   albuterol 90 mcg/actuation inhaler Unknown Other   Sig: Inhale 2 puffs 4 times a day as needed.   baclofen (Lioresal) 20 mg tablet Unknown Other   Sig: Take 1 tablet (20 mg) by mouth 4 times a day.   bisacodyl (Dulcolax) 10 mg suppository Unknown Other   Sig: Insert 1 suppository (10 mg) into the rectum every 3rd day if needed for constipation.   calcium carbonate-vitamin D3 500 mg-5 mcg (200 unit) tablet Unknown Other   Si tablet by g-tube route once daily.   diazePAM (Diastat AcuDiaL) 5-7.5-10 mg rectal kit Unknown Other   Sig: Insert 10 mg into the rectum every 6 hours if needed for seizures. 5-7.5-10mg rectal kit   ibuprofen 100 mg/5 mL suspension Unknown Other   Si mL (400 mg) by g-tube route every 6 hours if needed for fever (temp greater than 38.0 C).   multivitamin/iron/folic acid (CENTRUM ORAL) Unknown Other   Sig: 15 mL by peg tube route early in the morning.. LIQUID   polyethylene glycol 3350 (MIRALAX ORAL) Unknown Other   Si g by peg tube route twice a day. 17g  in 150ml water via g-tube   simethicone (Mylicon) 125 mg chewable tablet Unknown Other   Si tablet (125 mg) by g-tube route every 6 hours if needed for flatulence.      Facility-Administered Medications: None       Med list from [facility].   Tameka Babb CPhT

## 2024-02-20 NOTE — ED PROVIDER NOTES
HPI   Chief Complaint   Patient presents with    Fever     Pt. Arrived from [facility] for c/o of SOB and fever. Pt. In non verbal. Per EMS staff at facility noticed his O2 saturations @ 90% on room air with an elevated temp. On arrival to ED pt. Has a temp of 101.1, and a HR in the 140s. Pt. Is sating at 93% on room air      Shortness of Breath       HPI: 33-year-old male with a history of cerebral palsy, nonverbal at baseline presents for fever and cough.  A healthcare worker at his group home is present helping to provide history.  Reportedly there are multiple residents in the group home with similar symptoms.  He has developed a cough over the past several days and was noted to have mild difficulty breathing this morning.  The patient is nonverbal at baseline and unable to provide history.      Limitations to history: Nonverbal status, cerebral palsy  Independent Historians:   External Records Reviewed: HIE, outpatient notes, inpatient notes  ------------------------------------------------------------------------------------------------------------------------------------------  ROS: a ten point review of systems was performed and was negative except as per HPI.  ------------------------------------------------------------------------------------------------------------------------------------------  PMH / PSH: as per HPI, otherwise reviewed in EMR  MEDS: as per HPI, otherwise reviewed in EMR  ALLERGIES: as per HPI, otherwise reviewed in EMR  SocH:  as per HPI, otherwise reviewed in EMR  FH:  as per HPI, otherwise reviewed in EMR  ------------------------------------------------------------------------------------------------------------------------------------------  Physical Exam:  VS: As documented in the triage note and EMR flowsheet from this visit was reviewed  General: Fatigued appearing, deconditioned male in no distress eyes:  Extraocular movements grossly intact. No scleral  icterus. No discharge  HEENT:  Normocephalic.  Atraumatic  Neck: Moves neck freely. No gross masses  CV: Regular rhythm.  Tachycardic.  No murmurs, rubs or gallops   Resp: Coarse breath sounds bilaterally.  No accessory muscle use  GI: Soft, no masses, nontender. No rebound tenderness or guarding.  G-tube in place with no surrounding discharge or erythema  MSK: Frail, chronically contracted extremities  Skin: Warm, dry, intact.  No rash noted  Neuro: Nonverbal status.  Patient is reportedly at his neurological baseline  Psych: Alert to voice  ------------------------------------------------------------------------------------------------------------------------------------------  Hospital Course / Medical Decision Making:  Independent Interpretations: Chest xray  EKG as interpreted by me: Sinus tachycardia at 142 bpm with no signs of acute ischemia    MDM: 33 year old male with a history of cerebral palsy, nonverbal at baseline presents for fever and cough. He is reportedly at his neurological baseline. He is requiring 2-3L NC to maintain his oxygen saturation at above 95%. Chest xray shows no consolidation. He is positive for influenza. He was given IV fluids which did somewhat derease his heart rate. He was also given antipyretics through his g tube. The patient was admitted to the medicine service for further management    Discussion of Management with Other Providers:   I discussed the patient/results with: Emergency medicine team    Final diagnosis and disposition as below.    Results for orders placed or performed during the hospital encounter of 02/20/24  -CBC with Differential:        Result                      Value             Ref Range           WBC                         8.9               4.4 - 11.3 x*       nRBC                        0.0               0.0 - 0.0 /1*       RBC                         4.83              4.50 - 5.90 *       Hemoglobin                  15.3              13.5 - 17.5 *        Hematocrit                  46.3              41.0 - 52.0 %       MCV                         96                80 - 100 fL         MCH                         31.7              26.0 - 34.0 *       MCHC                        33.0              32.0 - 36.0 *       RDW                         12.3              11.5 - 14.5 %       Platelets                   272               150 - 450 x1*       Neutrophils %               73.5              40.0 - 80.0 %       Immature Granulocytes *     0.5               0.0 - 0.9 %         Lymphocytes %               11.2              13.0 - 44.0 %       Monocytes %                 12.9              2.0 - 10.0 %        Eosinophils %               1.4               0.0 - 6.0 %         Basophils %                 0.5               0.0 - 2.0 %         Neutrophils Absolute        6.54              1.20 - 7.70 *       Immature Granulocytes *     0.04              0.00 - 0.70 *       Lymphocytes Absolute        0.99 (L)          1.20 - 4.80 *       Monocytes Absolute          1.14 (H)          0.10 - 1.00 *       Eosinophils Absolute        0.12              0.00 - 0.70 *       Basophils Absolute          0.04              0.00 - 0.10 *  XR chest 2 views   Final Result    1. Low lung volumes with resultant bronchovascular crowding.    2. Within these limitations, no focal infiltrate, edema or effusion.    Signed by Alfredito Velasquez MD                                 No data recorded                   Patient History   Past Medical History:   Diagnosis Date    Other conditions influencing health status     Pneumonia     No past surgical history on file.  Family History   Problem Relation Name Age of Onset    No Known Problems Mother       Social History     Tobacco Use    Smoking status: Never    Smokeless tobacco: Never   Substance Use Topics    Alcohol use: Not on file    Drug use: Not on file       Physical Exam   ED Triage Vitals [02/20/24 0744]   Temperature Heart Rate Respirations BP    (!) 38.4 °C (101.1 °F) (!) 140 20 (!) 157/94      Pulse Ox Temp Source Heart Rate Source Patient Position   (!) 93 % Axillary -- --      BP Location FiO2 (%)     -- --       Physical Exam    ED Course & MDM   Diagnoses as of 02/23/24 1205   Influenza A       Medical Decision Making      Procedure  Procedures     Mitch Beverly, DO  02/23/24 1212

## 2024-02-20 NOTE — PROGRESS NOTES
Transitional Care Coordination Progress Note:  Plan per Medical/Surgical team: treatment of Flu A with tylenol & IV fluids  Status: ED  Payor source: medicaid  Discharge disposition: [facility]  [phone] nurse station  Potential Barriers: PEG, WC bound, C.P.  ADOD: 2/22/2024    MUST CALL  to  report prior to DC:  Dr Kelly Delgadillo [phone]    L Linda Leos RN, BSN Transitional Care Coordinator ED# [phone]      02/20/24 1422   Discharge Planning   Living Arrangements Friends   Support Systems Parent   Assistance Needed NEED to call   to accept patient back to group [place] PRIOR to DC.   Type of Residence Group home   Do you have animals or pets at home? No   Care Facility Name [facility]   Patient expects to be discharged to: [facility]   Does the patient need discharge transport arranged? Yes   RoundTrip coordination needed? Yes   Has discharge transport been arranged? No   Financial Resource Strain   How hard is it for you to pay for the very basics like food, housing, medical care, and heating? Not hard   Housing Stability   In the last 12 months, was there a time when you were not able to pay the mortgage or rent on time? N   In the last 12 months, how many places have you lived? 1   In the last 12 months, was there a time when you did not have a steady place to sleep or slept in a shelter (including now)? N   Transportation Needs   In the past 12 months, has lack of transportation kept you from medical appointments or from getting medications? no   In the past 12 months, has lack of transportation kept you from meetings, work, or from getting things needed for daily living? No

## 2024-02-20 NOTE — PROGRESS NOTES
[facility]  [address] [address]  [phone] nurse station    MUST CALL DR to DR report prior to DC:  Dr Kelly Delgadillo [phone]     02/20/24 1425   Current Planned Discharge Disposition   Current Planned Discharge Disposition Home

## 2024-02-21 LAB
ANION GAP SERPL CALC-SCNC: 13 MMOL/L (ref 10–20)
ATRIAL RATE: 142 BPM
BUN SERPL-MCNC: 6 MG/DL (ref 6–23)
CALCIUM SERPL-MCNC: 8.6 MG/DL (ref 8.6–10.3)
CHLORIDE SERPL-SCNC: 98 MMOL/L (ref 98–107)
CO2 SERPL-SCNC: 31 MMOL/L (ref 21–32)
CREAT SERPL-MCNC: 0.56 MG/DL (ref 0.5–1.3)
EGFRCR SERPLBLD CKD-EPI 2021: >90 ML/MIN/1.73M*2
ERYTHROCYTE [DISTWIDTH] IN BLOOD BY AUTOMATED COUNT: 12.2 % (ref 11.5–14.5)
GLUCOSE SERPL-MCNC: 83 MG/DL (ref 74–99)
HCT VFR BLD AUTO: 43 % (ref 41–52)
HGB BLD-MCNC: 14 G/DL (ref 13.5–17.5)
LACTATE SERPL-SCNC: 1 MMOL/L (ref 0.4–2)
MCH RBC QN AUTO: 32 PG (ref 26–34)
MCHC RBC AUTO-ENTMCNC: 32.6 G/DL (ref 32–36)
MCV RBC AUTO: 98 FL (ref 80–100)
NRBC BLD-RTO: 0 /100 WBCS (ref 0–0)
P AXIS: 60 DEGREES
P OFFSET: 215 MS
P ONSET: 154 MS
PLATELET # BLD AUTO: 228 X10*3/UL (ref 150–450)
POTASSIUM SERPL-SCNC: 3.9 MMOL/L (ref 3.5–5.3)
PR INTERVAL: 138 MS
Q ONSET: 223 MS
QRS COUNT: 23 BEATS
QRS DURATION: 64 MS
QT INTERVAL: 266 MS
QTC CALCULATION(BAZETT): 409 MS
QTC FREDERICIA: 354 MS
R AXIS: 61 DEGREES
RBC # BLD AUTO: 4.37 X10*6/UL (ref 4.5–5.9)
SODIUM SERPL-SCNC: 138 MMOL/L (ref 136–145)
T AXIS: 66 DEGREES
T OFFSET: 356 MS
VENTRICULAR RATE: 142 BPM
WBC # BLD AUTO: 8.6 X10*3/UL (ref 4.4–11.3)

## 2024-02-21 PROCEDURE — 82374 ASSAY BLOOD CARBON DIOXIDE: CPT | Performed by: FAMILY MEDICINE

## 2024-02-21 PROCEDURE — 85027 COMPLETE CBC AUTOMATED: CPT | Performed by: FAMILY MEDICINE

## 2024-02-21 PROCEDURE — 2500000004 HC RX 250 GENERAL PHARMACY W/ HCPCS (ALT 636 FOR OP/ED): Performed by: FAMILY MEDICINE

## 2024-02-21 PROCEDURE — 2500000001 HC RX 250 WO HCPCS SELF ADMINISTERED DRUGS (ALT 637 FOR MEDICARE OP): Performed by: PHYSICIAN ASSISTANT

## 2024-02-21 PROCEDURE — 2500000001 HC RX 250 WO HCPCS SELF ADMINISTERED DRUGS (ALT 637 FOR MEDICARE OP): Performed by: FAMILY MEDICINE

## 2024-02-21 PROCEDURE — 1100000001 HC PRIVATE ROOM DAILY

## 2024-02-21 PROCEDURE — 2500000002 HC RX 250 W HCPCS SELF ADMINISTERED DRUGS (ALT 637 FOR MEDICARE OP, ALT 636 FOR OP/ED): Performed by: FAMILY MEDICINE

## 2024-02-21 PROCEDURE — 36415 COLL VENOUS BLD VENIPUNCTURE: CPT | Performed by: GENERAL PRACTICE

## 2024-02-21 PROCEDURE — 83605 ASSAY OF LACTIC ACID: CPT | Performed by: GENERAL PRACTICE

## 2024-02-21 RX ADMIN — PHENOBARBITAL 113.4 MG: 32.4 TABLET ORAL at 10:01

## 2024-02-21 RX ADMIN — ACETAMINOPHEN 650 MG: 325 TABLET ORAL at 05:36

## 2024-02-21 RX ADMIN — Medication 1 TABLET: at 10:01

## 2024-02-21 RX ADMIN — BACLOFEN 20 MG: 10 TABLET ORAL at 06:00

## 2024-02-21 RX ADMIN — BACLOFEN 20 MG: 10 TABLET ORAL at 16:25

## 2024-02-21 RX ADMIN — PHENOBARBITAL 113.4 MG: 32.4 TABLET ORAL at 21:18

## 2024-02-21 RX ADMIN — ACETAMINOPHEN 650 MG: 325 TABLET ORAL at 10:07

## 2024-02-21 RX ADMIN — BACLOFEN 20 MG: 10 TABLET ORAL at 21:18

## 2024-02-21 RX ADMIN — ENOXAPARIN SODIUM 40 MG: 40 INJECTION SUBCUTANEOUS at 21:19

## 2024-02-21 RX ADMIN — GUAIFENESIN 600 MG: 600 TABLET ORAL at 21:18

## 2024-02-21 RX ADMIN — FAMOTIDINE 20 MG: 20 TABLET, FILM COATED ORAL at 21:18

## 2024-02-21 RX ADMIN — Medication 15 ML: at 10:08

## 2024-02-21 RX ADMIN — OSELTAMAVIR PHOSPHATE 75 MG: 75 CAPSULE ORAL at 10:01

## 2024-02-21 RX ADMIN — FAMOTIDINE 20 MG: 20 TABLET, FILM COATED ORAL at 10:01

## 2024-02-21 RX ADMIN — OSELTAMAVIR PHOSPHATE 75 MG: 75 CAPSULE ORAL at 21:18

## 2024-02-21 RX ADMIN — BACLOFEN 20 MG: 10 TABLET ORAL at 13:42

## 2024-02-21 RX ADMIN — POLYETHYLENE GLYCOL (3350) 17 G: 17 POWDER, FOR SOLUTION ORAL at 10:01

## 2024-02-21 ASSESSMENT — COGNITIVE AND FUNCTIONAL STATUS - GENERAL
STANDING UP FROM CHAIR USING ARMS: TOTAL
TOILETING: TOTAL
MOVING TO AND FROM BED TO CHAIR: TOTAL
EATING MEALS: TOTAL
CLIMB 3 TO 5 STEPS WITH RAILING: TOTAL
EATING MEALS: TOTAL
DRESSING REGULAR LOWER BODY CLOTHING: TOTAL
TOILETING: TOTAL
MOBILITY SCORE: 6
DRESSING REGULAR LOWER BODY CLOTHING: TOTAL
TOILETING: TOTAL
EATING MEALS: TOTAL
TURNING FROM BACK TO SIDE WHILE IN FLAT BAD: TOTAL
MOVING TO AND FROM BED TO CHAIR: TOTAL
WALKING IN HOSPITAL ROOM: TOTAL
DRESSING REGULAR UPPER BODY CLOTHING: TOTAL
WALKING IN HOSPITAL ROOM: TOTAL
DRESSING REGULAR UPPER BODY CLOTHING: TOTAL
HELP NEEDED FOR BATHING: TOTAL
MOBILITY SCORE: 6
TURNING FROM BACK TO SIDE WHILE IN FLAT BAD: TOTAL
PATIENT BASELINE BEDBOUND: YES
MOVING TO AND FROM BED TO CHAIR: TOTAL
WALKING IN HOSPITAL ROOM: TOTAL
CLIMB 3 TO 5 STEPS WITH RAILING: TOTAL
MOVING FROM LYING ON BACK TO SITTING ON SIDE OF FLAT BED WITH BEDRAILS: TOTAL
MOVING FROM LYING ON BACK TO SITTING ON SIDE OF FLAT BED WITH BEDRAILS: TOTAL
PERSONAL GROOMING: TOTAL
HELP NEEDED FOR BATHING: TOTAL
DAILY ACTIVITIY SCORE: 6
PERSONAL GROOMING: TOTAL
MOVING FROM LYING ON BACK TO SITTING ON SIDE OF FLAT BED WITH BEDRAILS: TOTAL
HELP NEEDED FOR BATHING: TOTAL
PERSONAL GROOMING: TOTAL
STANDING UP FROM CHAIR USING ARMS: TOTAL
DAILY ACTIVITIY SCORE: 6
MOBILITY SCORE: 6
STANDING UP FROM CHAIR USING ARMS: TOTAL
DRESSING REGULAR UPPER BODY CLOTHING: TOTAL
DRESSING REGULAR LOWER BODY CLOTHING: TOTAL
CLIMB 3 TO 5 STEPS WITH RAILING: TOTAL
TURNING FROM BACK TO SIDE WHILE IN FLAT BAD: TOTAL
DAILY ACTIVITIY SCORE: 6

## 2024-02-21 ASSESSMENT — PAIN SCALES - WONG BAKER
WONGBAKER_NUMERICALRESPONSE: NO HURT
WONGBAKER_NUMERICALRESPONSE: NO HURT

## 2024-02-21 ASSESSMENT — PAIN SCALES - GENERAL: PAINLEVEL_OUTOF10: 0 - NO PAIN

## 2024-02-21 NOTE — H&P
History Of Present Illness  Cory Cobian is a 33 y.o. male presenting with h/o cough congestion and hypoxia  Pt does have cerebral palsy and is not able to provide any information   Dw ED and pt noted to have flu,   And does have hypoxia for which oxygen started   Pt admitted for ongoing care also considering his social situatiion as he is in group home     Past Medical History  He has a past medical history of Other conditions influencing health status.  Cerebral palsy  Peg    Surgical History  He has no past surgical history on file.   Does have contracture in both lower ext  Social History  He reports that he has never smoked. He has never used smokeless tobacco. No history on file for alcohol use and drug use.    Family History  Family History   Problem Relation Name Age of Onset    No Known Problems Mother          Allergies  Patient has no known allergies.    Review of Systems   Unable to get from pt,   Physical Exam   Pt is well nurished no distress  HEENT normal mucosa  Neck supple no jvd  However exam is limited due to his contractures and cerebral palsy  Cvs regular  Resp good air entry johnathan   Does have congestion  Abdo soft bs active, no masses  Nontender   Peg site ok  Ext no edema  Does have contractures johnathan   Last Recorded Vitals  BP (!) 142/94   Pulse (!) 102   Temp 37.2 °C (98.9 °F)   Resp 20   SpO2 94%     Relevant Results  Scheduled medications  baclofen, 20 mg, oral, 4x daily  bisacodyl, 10 mg, rectal, Daily  calcium carbonate-vitamin D3, 1 tablet, g-tube, Daily  enoxaparin, 40 mg, subcutaneous, q24h  famotidine, 20 mg, oral, BID   Or  famotidine, 20 mg, intravenous, BID  multivitamin with iron-minerals, 15 mL, g-tube, Daily  oseltamivir, 75 mg, nasogastric tube, BID  PHENobarbitaL, 113.4 mg, oral, BID  polyethylene glycol, 17 g, oral, Daily      Continuous medications     PRN medications  PRN medications: acetaminophen, albuterol, guaiFENesin, ibuprofen, simethicone  Results for orders  placed or performed during the hospital encounter of 02/20/24 (from the past 96 hour(s))   CBC with Differential   Result Value Ref Range    WBC 8.9 4.4 - 11.3 x10*3/uL    nRBC 0.0 0.0 - 0.0 /100 WBCs    RBC 4.83 4.50 - 5.90 x10*6/uL    Hemoglobin 15.3 13.5 - 17.5 g/dL    Hematocrit 46.3 41.0 - 52.0 %    MCV 96 80 - 100 fL    MCH 31.7 26.0 - 34.0 pg    MCHC 33.0 32.0 - 36.0 g/dL    RDW 12.3 11.5 - 14.5 %    Platelets 272 150 - 450 x10*3/uL    Neutrophils % 73.5 40.0 - 80.0 %    Immature Granulocytes %, Automated 0.5 0.0 - 0.9 %    Lymphocytes % 11.2 13.0 - 44.0 %    Monocytes % 12.9 2.0 - 10.0 %    Eosinophils % 1.4 0.0 - 6.0 %    Basophils % 0.5 0.0 - 2.0 %    Neutrophils Absolute 6.54 1.20 - 7.70 x10*3/uL    Immature Granulocytes Absolute, Automated 0.04 0.00 - 0.70 x10*3/uL    Lymphocytes Absolute 0.99 (L) 1.20 - 4.80 x10*3/uL    Monocytes Absolute 1.14 (H) 0.10 - 1.00 x10*3/uL    Eosinophils Absolute 0.12 0.00 - 0.70 x10*3/uL    Basophils Absolute 0.04 0.00 - 0.10 x10*3/uL   Comprehensive Metabolic Panel   Result Value Ref Range    Glucose 115 (H) 74 - 99 mg/dL    Sodium 138 136 - 145 mmol/L    Potassium 3.7 3.5 - 5.3 mmol/L    Chloride 99 98 - 107 mmol/L    Bicarbonate 31 21 - 32 mmol/L    Anion Gap 12 10 - 20 mmol/L    Urea Nitrogen 7 6 - 23 mg/dL    Creatinine 0.64 0.50 - 1.30 mg/dL    eGFR >90 >60 mL/min/1.73m*2    Calcium 9.1 8.6 - 10.3 mg/dL    Albumin 4.6 3.4 - 5.0 g/dL    Alkaline Phosphatase 128 (H) 33 - 120 U/L    Total Protein 8.0 6.4 - 8.2 g/dL    AST 16 9 - 39 U/L    Bilirubin, Total 0.2 0.0 - 1.2 mg/dL    ALT 19 10 - 52 U/L   Lactate   Result Value Ref Range    Lactate 1.6 0.4 - 2.0 mmol/L   Sars-CoV-2 and Influenza A/B PCR   Result Value Ref Range    Flu A Result Detected (A) Not Detected    Flu B Result Not Detected Not Detected    Coronavirus 2019, PCR Not Detected Not Detected   ECG 12 lead   Result Value Ref Range    Ventricular Rate 142 BPM    Atrial Rate 142 BPM    NY Interval 138 ms    QRS  Duration 64 ms    QT Interval 266 ms    QTC Calculation(Bazett) 409 ms    P Axis 60 degrees    R Axis 61 degrees    T Axis 66 degrees    QRS Count 23 beats    Q Onset 223 ms    P Onset 154 ms    P Offset 215 ms    T Offset 356 ms    QTC Fredericia 354 ms   Urinalysis with Reflex Microscopic   Result Value Ref Range    Color, Urine Yellow Straw, Yellow    Appearance, Urine Cloudy (N) Clear    Specific Gravity, Urine 1.015 1.005 - 1.035    pH, Urine 7.0 5.0, 5.5, 6.0, 6.5, 7.0, 7.5, 8.0    Protein, Urine 30 (1+) (N) NEGATIVE mg/dL    Glucose, Urine NEGATIVE NEGATIVE mg/dL    Blood, Urine MODERATE (2+) (A) NEGATIVE    Ketones, Urine NEGATIVE NEGATIVE mg/dL    Bilirubin, Urine NEGATIVE NEGATIVE    Urobilinogen, Urine <2.0 <2.0 mg/dL    Nitrite, Urine NEGATIVE NEGATIVE    Leukocyte Esterase, Urine NEGATIVE NEGATIVE   Microscopic Only, Urine   Result Value Ref Range    WBC, Urine NONE 1-5, NONE /HPF    RBC, Urine >20 (A) NONE, 1-2, 3-5 /HPF    Bacteria, Urine 1+ (A) NONE SEEN /HPF    Mucus, Urine 1+ Reference range not established. /LPF    Amorphous Crystals, Urine 3+ (A) NONE, 1+, 2+ /HPF   Lactate   Result Value Ref Range    Lactate 2.5 (H) 0.4 - 2.0 mmol/L   Lactate   Result Value Ref Range    Lactate 1.0 0.4 - 2.0 mmol/L   Basic metabolic panel   Result Value Ref Range    Glucose 83 74 - 99 mg/dL    Sodium 138 136 - 145 mmol/L    Potassium 3.9 3.5 - 5.3 mmol/L    Chloride 98 98 - 107 mmol/L    Bicarbonate 31 21 - 32 mmol/L    Anion Gap 13 10 - 20 mmol/L    Urea Nitrogen 6 6 - 23 mg/dL    Creatinine 0.56 0.50 - 1.30 mg/dL    eGFR >90 >60 mL/min/1.73m*2    Calcium 8.6 8.6 - 10.3 mg/dL   CBC   Result Value Ref Range    WBC 8.6 4.4 - 11.3 x10*3/uL    nRBC 0.0 0.0 - 0.0 /100 WBCs    RBC 4.37 (L) 4.50 - 5.90 x10*6/uL    Hemoglobin 14.0 13.5 - 17.5 g/dL    Hematocrit 43.0 41.0 - 52.0 %    MCV 98 80 - 100 fL    MCH 32.0 26.0 - 34.0 pg    MCHC 32.6 32.0 - 36.0 g/dL    RDW 12.2 11.5 - 14.5 %    Platelets 228 150 - 450 x10*3/uL      Cxr no acute finding      Assessment/Plan   Principal Problem:    Influenza A  Active Problems:    CP (cerebral palsy), spastic, quadriplegic (CMS/HCC)    Spastic hip dislocation    Seizure disorder (CMS/HCC)    Functional quadriplegia (CMS/HCC)    Will resume his home meds  Reviewed   Pt is on baclofen as well  Started on tamiflu  See orders  Dvt ppx is in place  Wean oxygen as tolerated    Pt seen in the ED       Ramón Helms MD

## 2024-02-21 NOTE — PROGRESS NOTES
Sw asked to follow up with Marlin Berman regarding transport back to group home. Spoke with them and was told that they would need for us to set up transportation for return.

## 2024-02-21 NOTE — NURSING NOTE
No telemetry boxes available. Patient not on telemetry at this time.    Past Medical History





- Admission


Chief Complaint: Scheduled RLTCS


History of Present Illness: 





27yo  @ 39.0wks by LMP/sono, OLVIN 20 here for RLTCS, no BTL secondary 

to late signing of consent


No VB/LOF. No ctx. +FM





Preg c/b: 2 prior C/S, obesity


PNC @ [address]


History Source: Patient


Limitations to Obtaining History: No Limitations





- Past Medical History


CNS: No: Alzheimer's, CVA, Dementia, Migraine, Multiple Sclerosis, Peripheral 

Neuropathy, Parkinson's, Seizure, Syncope, TIA, Vertigo, Other


Cardiovascular: No: AFIB, Aneurysm, Aortic Insufficiency, Aortic Stenosis, CAD, 

CHF, Deep Vein Thrombosis, HTN, Hyperlipdemia, MI, Mitral Insufficiency, Mitral 

Stenosis, Murmur, Pulmonary Hypertension, Other


Pulmonary: No: Asthma, Bronchitis, Cancer, COPD, O2 Dependent, Pneumonia, 

Previously Intubated, Pulmonary Embolus, Pulmonary Fibrosis, Sleep Apnea, Other


Gastrointestinal: No: Ascites, Cancer, Constipation, Crohn's Disease, 

Diverticulitis, Diverticulosis, Esophageal Varices, Gastritis, GERD, GI Bleed, 

Hemorrhoids, Hiatal Hernia, Inflamatory Bowel Disease, Irritable Bowel Disease, 

Pancreatitis, Peptic Ulcer Disease, Ulcerative Colitis, Other


Hepatobiliary: No: Cirrhosis, Cholelithiasis, Cholecystitis, 

Choledocholithiasis, Hepatitis A, Hepatitis B, Hepatitis C, Other


Reproductive: No: Ectopic Pregnancy, Endometriosis, Fibroids, PID, Polycystic 

Ovary Syndrome, Postmenopausal, Other


...: 3


...Para: 2


...Term: 2


...: 0


...Spon : 0


...Induced : 0


... Weeks Gestation by Dates: 39.0


Heme/Onc: Yes: Anemia


Infectious Disease: No: AIDS, C-Diff, Herpes Zoster, HIV, MRSA, STD's, 

Tuberculosis, VREF, Other


Psych: No: Addictions, Anxiety, Bipolar, Depression, Panic, Psychosis, 

Schizophrenia, Other





- Past Surgical History


Past Surgical History: Yes: 


Hx Myomectomy: No


Hx Transabdominal Cerclage: No





- Smoking History


Smoking history: Never smoked


Have you smoked in the past 12 months: No





- Alcohol/Substance Use


Hx Alcohol Use: No


History of Substance Use: reports: None





- Social History


Usual Living Arrangement: Yes: With Spouse


Do you think of yourself as: Straight/Heterosexual


ADL: Independent


History of Recent Travel: No





Home Medications





- Allergies


Allergies/Adverse Reactions: 


                                    Allergies











Allergy/AdvReac Type Severity Reaction Status Date / Time


 


No Known Allergies Allergy   Verified 20 12:39














- Home Medications


Home Medications: 


Ambulatory Orders





Breast Pump 1 each MC 5XD 30 Days #1 each 20 


Ferrous Sulfate [Feosol] 325 mg PO DAILY #30 tablet 20 


Ibuprofen 600 mg PO Q6H PRN #30 tablet 20 


Oxycodone HCl/Acetaminophen [Percocet 5-325 mg Tablet -] 1 - 2 tab PO Q6H PRN 

#20 tab MDD 4 20 











Review of Systems





- Review of Systems


Constitutional: reports: No Symptoms


Cardiovascular: reports: No Symptoms


Respiratory: reports: No Symptoms





Physical Exam - Maternity





- Abdominal Exam/OB


Number of Fetuses: Single


Fetal Presentation: Vertex


Contractions: No


Fetal Monitor Mode: External


Fetal Heart Rate Location: [facility]


Category: I


Accelerations: Non-Uniform


Decelerations: None





- Vaginal Exam/OB


Vaginal Bleeding: No


Speculum Exam: No


Amniotic Membrane Status: Intact


Fetal Presentation: Vertex/Position





- Physical Exam


Edema: No





Imaging





- Results


Ultrasound: Report Reviewed





Problem List





- Problems


(1) 39 weeks gestation of pregnancy


Code(s): Z3A.39 - 39 WEEKS GESTATION OF PREGNANCY   





Assessment/Plan





27yo  @ 39+wks here for scheduled RLTCS


Admit to L&D


NPO,IVFs


Cat I tracing


Spinal, Mendoza, SCDs, Ancef


Risks reviewed including bleeding, infection and injury to 

bladder/bowel/vessels/adnexa/nerves. Unable to do BTL as patient signed consent 

on 6/10/2020, less than 30 days. All questions answered; consent signed.





JABIER Gore MD

## 2024-02-21 NOTE — CONSULTS
Nutrition Assessment Note  Nutrition Assessment      Reason for Assessment  Reason for Assessment: Provider consult order, Tube feeding recommendations  Hospital day #2 for influenza A.  Resides at group home where he receives supplemental EN via PEG.  Staff reports he also takes modified meals well for them. Staff reported a variety of consistencies including chopped, ground, pureed, and crackers.  Would have SLP assess prior to feeding diet.      History:  Food and Nutrient History  Energy Intake: Good > 75 %  Food and Nutrient History: Resides at group home and recieves custom EN of Nutren 1.0 645ml, 575 ml water, 2 Tbsp. Beneprotein, 3/4 tsp. salt in 400ml bolus TID and has oral intake of chopped, ground diet.  Staff reports he likes to eat food on Ritz crackers.  Some items are pureed, likes yogurt.  Estimated, 675kcal & 37 gr PRO from TF.  800ml free water flush daily.    Diagnosis   CP (cerebral palsy), spastic, quadriplegic (CMS/HCC)   Kidney stones   Spastic hip dislocation   Seizure disorder (CMS/HCC)   Pain, hip   Functional quadriplegia (CMS/HCC)   Influenza A     Anthropometrics:  Height: 152.4 cm (5')  Weight: (!) 44 kg (97 lb)  BMI (Calculated): 18.94  Weight Change  Weight History / % Weight Change: 12/5/23: 44kg  Significant Weight Loss: No  IBW/kg (Dietitian Calculated): 48.2 kg     Energy Needs:  Calculated Energy Needs Using Equations  Height: 152.4 cm (5')  Temp: 37.2 °C (99 °F)    Estimated Energy Needs  Method for Estimating Needs: 6641-0449 @ 25-30 kcal/kg    Estimated Protein Needs  Method for Estimating Needs: 48-57 @ 1.0-1.2 gr/kg    Estimated Fluid Needs  Total Fluid Estimated Needs (mL): 1445 mL  Total Fluid Estimated Needs (mL/kg): 30 mL/kg     Dietary Orders   Enteral feeding with NPO   Tube feeding formula: Jevity 1.5   Tube feeding bolus (mL): 320   Tube feeding bolus frequency: TID   Tube feeding flush (mL): 60 pre/post feeding  Flush type: Water    Comments: Additional flush per  MD, usually has 800 ml free water flush daily at home.    Nutrition Focused Physical Findings:   Deferred due to Cerebral palsy, contracted quadriplegic    Results   Basic metabolic panel  Result Value Ref Range   Glucose 83 74 - 99 mg/dL   Sodium 138 136 - 145 mmol/L   Potassium 3.9 3.5 - 5.3 mmol/L   Chloride 98 98 - 107 mmol/L   Bicarbonate 31 21 - 32 mmol/L   Anion Gap 13 10 - 20 mmol/L   Urea Nitrogen 6 6 - 23 mg/dL   Creatinine 0.56 0.50 - 1.30 mg/dL   eGFR >90 >60 mL/min/1.73m*2   Calcium 8.6 8.6 - 10.3 mg/dL  CBC  Result Value Ref Range   WBC 8.6 4.4 - 11.3 x10*3/uL   nRBC 0.0 0.0 - 0.0 /100 WBCs   RBC 4.37 (L) 4.50 - 5.90 x10*6/uL   Hemoglobin 14.0 13.5 - 17.5 g/dL   Hematocrit 43.0 41.0 - 52.0 %   MCV 98 80 - 100 fL   MCH 32.0 26.0 - 34.0 pg   MCHC 32.6 32.0 - 36.0 g/dL   RDW 12.2 11.5 - 14.5 %   Platelets 228 150 - 450 x10*3/uL     Nutrition Diagnosis    Nutrition Diagnosis 2: Swallowing difficulity  Diagnosis Status (2): New  Related to (2): cerebral palsy  As Evidenced by (2): PEG & modified diet        Nutrition Interventions/Recommendations   Nutrition Prescription  Individualized Nutrition Prescription Provided for : House substitute for Nutren 1.0 is Jevity 1.5.  Jevity 320ml bolus over 1 hour TID will provide 1440 kcal, 61 gr PRO, and 730ml water. 60ml pre/post feed, 1090ml water.  MD to add additional water flush as indicated.  Remain elevated 1 hour s/p feeding.   MD to decide if diet appropriate at this time, EN will meet estimated energy needs without oral diet. Would have Speech evaluate for safest consistancy prior to oral diet.    Food and/or Nutrient Delivery Interventions    Enteral Intake: Enteral nutrition site care, Feeding tube flush, Modify rate of enteral nutrition, Modify schedule of enteral nutrition  Goal: Meet >75% estimated needs with EN.     Nutrition Monitoring and Evaluation   Food and Nutrient Related History   Enteral and Parenteral Nutrition Intake: Enteral nutrition  formula/solution, Enteral nutrition intake  Criteria: tolerate EN at goal rate    Anthropometrics: Body Composition/Growth/Weight History  Weight: Measured weight  Criteria: daily  Weight Change: Weight change percentage  Criteria: weekly    Biochemical Data, Medical Tests and Procedures  Electrolyte and Renal Panel: BUN, Calcium, serum, Chloride, Creatinine, Magnesium, Phosphorus, Potassium, Sodium  Criteria: as indicated     Nutrition Focused Physical Findings   Digestive System: Anorexia, Constipation, Diarrhea, Early satiety, Nausea, Vomiting  Criteria: daily     Follow Up  Last Date of Nutrition Visit: 02/21/24  Nutrition Follow-Up Needed?: Dietitian to reassess per policy  Follow up Comment: JOE

## 2024-02-21 NOTE — NURSING NOTE
Patient has G-tube present. RN called patient's nursing facility to ask about feeding schedule. Per nurse at facility, patient has feedings at 8am, 12pm, and 5pm through G tube. Patient's feeding consists of Nutren 1.0 fiber, salt, protein powder, and water per facility nurse. This RN consulted dietary for proper evaluation during patient's stay.

## 2024-02-21 NOTE — PROGRESS NOTES
Cory Cobian is a 33 y.o. male on day 1 of admission presenting with Influenza A.      Subjective   No events overnight       Objective     Last Recorded Vitals  BP (!) 144/105 (BP Location: Left arm, Patient Position: Lying) Comment: rn noted  Pulse 93   Temp 37.4 °C (99.3 °F)   Resp 20   SpO2 96%   Intake/Output last 3 Shifts:    Intake/Output Summary (Last 24 hours) at 2/21/2024 1127  Last data filed at 2/21/2024 0255  Gross per 24 hour   Intake 608.33 ml   Output --   Net 608.33 ml       Admission Weight       Daily Weight  12/05/23 : (!) 44 kg (97 lb)    Image Results  ECG 12 lead  Sinus tachycardia  Otherwise normal ECG  No previous ECGs available  XR chest 2 views  Narrative: STUDY:  Chest Radiographs;  02/20/2024 7:54AM  INDICATION:  Cough, fever and hypoxia.  COMPARISON:  12/29/2022 XR Chest.  ACCESSION NUMBER(S):  [id number]  ORDERING CLINICIAN:  MARIELLE DURAN  TECHNIQUE:  Frontal and lateral chest.   FINDINGS:  CARDIOMEDIASTINAL SILHOUETTE:  Cardiomediastinal silhouette is normal in size and configuration.     LUNGS:  Lung volumes are slightly reduced with resultant bronchovascular  crowding.  Within these limitations, no focal infiltrate, edema or  effusion is seen..     ABDOMEN:  Moderate gaseous distention is seen in the bowel loops with stable  elevation of the left hemidiaphragm..     BONES:  No acute osseous changes.  Impression: 1. Low lung volumes with resultant bronchovascular crowding.  2. Within these limitations, no focal infiltrate, edema or effusion.  Signed by Alfredito Velasquez MD      Physical Exam  No distress  Chest fair air enty   Cvs regular  Abdo soft bs activ,e no masses, peg site ok  Relevant Results  Ext contracted        Assessment/Plan           Principal Problem:    Influenza A  Active Problems:    CP (cerebral palsy), spastic, quadriplegic (CMS/HCC)    Spastic hip dislocation    Seizure disorder (CMS/HCC)    Functional quadriplegia (CMS/HCC)    Cont with curren t  Wean  oxygen   Will plan on dc to group home tomorrow  Social work to check if group home iwll accept          Ramón Helms MD

## 2024-02-21 NOTE — CARE PLAN
The clinical goals for the shift include patient will be fever free this shift    Problem: Pain  Goal: My pain/discomfort is manageable  Outcome: Progressing     Problem: Safety  Goal: Patient will be injury free during hospitalization  Outcome: Progressing  Goal: I will remain free of falls  Outcome: Progressing     Problem: Daily Care  Goal: Daily care needs are met  Outcome: Progressing     Problem: Psychosocial Needs  Goal: Demonstrates ability to cope with hospitalization/illness  Outcome: Progressing  Goal: Collaborate with me, my family, and caregiver to identify my specific goals  Outcome: Progressing     Problem: Discharge Barriers  Goal: My discharge needs are met  Outcome: Progressing     Problem: Fall/Injury  Goal: Not fall by end of shift  Outcome: Progressing  Goal: Be free from injury by end of the shift  Outcome: Progressing     Problem: Skin  Goal: Decreased wound size/increased tissue granulation at next dressing change  Outcome: Progressing  Goal: Participates in plan/prevention/treatment measures  Outcome: Progressing  Goal: Prevent/manage excess moisture  Outcome: Progressing  Goal: Prevent/minimize sheer/friction injuries  Outcome: Progressing  Goal: Promote/optimize nutrition  Outcome: Progressing  Goal: Promote skin healing  Outcome: Progressing

## 2024-02-22 VITALS
TEMPERATURE: 98.3 F | DIASTOLIC BLOOD PRESSURE: 94 MMHG | HEART RATE: 88 BPM | HEIGHT: 60 IN | WEIGHT: 97 LBS | RESPIRATION RATE: 19 BRPM | BODY MASS INDEX: 19.04 KG/M2 | SYSTOLIC BLOOD PRESSURE: 146 MMHG | OXYGEN SATURATION: 95 %

## 2024-02-22 LAB
ANION GAP SERPL CALC-SCNC: 10 MMOL/L (ref 10–20)
BUN SERPL-MCNC: 7 MG/DL (ref 6–23)
CALCIUM SERPL-MCNC: 8.7 MG/DL (ref 8.6–10.3)
CHLORIDE SERPL-SCNC: 98 MMOL/L (ref 98–107)
CO2 SERPL-SCNC: 34 MMOL/L (ref 21–32)
CREAT SERPL-MCNC: 0.54 MG/DL (ref 0.5–1.3)
EGFRCR SERPLBLD CKD-EPI 2021: >90 ML/MIN/1.73M*2
GLUCOSE SERPL-MCNC: 143 MG/DL (ref 74–99)
HOLD SPECIMEN: NORMAL
POTASSIUM SERPL-SCNC: 3.4 MMOL/L (ref 3.5–5.3)
SODIUM SERPL-SCNC: 139 MMOL/L (ref 136–145)

## 2024-02-22 PROCEDURE — 2500000002 HC RX 250 W HCPCS SELF ADMINISTERED DRUGS (ALT 637 FOR MEDICARE OP, ALT 636 FOR OP/ED): Performed by: FAMILY MEDICINE

## 2024-02-22 PROCEDURE — 2500000001 HC RX 250 WO HCPCS SELF ADMINISTERED DRUGS (ALT 637 FOR MEDICARE OP): Performed by: FAMILY MEDICINE

## 2024-02-22 PROCEDURE — 2500000001 HC RX 250 WO HCPCS SELF ADMINISTERED DRUGS (ALT 637 FOR MEDICARE OP): Performed by: PHYSICIAN ASSISTANT

## 2024-02-22 PROCEDURE — 36415 COLL VENOUS BLD VENIPUNCTURE: CPT | Performed by: FAMILY MEDICINE

## 2024-02-22 PROCEDURE — 80048 BASIC METABOLIC PNL TOTAL CA: CPT | Performed by: FAMILY MEDICINE

## 2024-02-22 RX ORDER — OSELTAMIVIR PHOSPHATE 75 MG/1
75 CAPSULE ORAL 2 TIMES DAILY
Qty: 6 CAPSULE | Refills: 0 | Status: SHIPPED | OUTPATIENT
Start: 2024-02-22 | End: 2024-02-25

## 2024-02-22 RX ORDER — GUAIFENESIN 600 MG/1
600 TABLET, EXTENDED RELEASE ORAL EVERY 12 HOURS PRN
Start: 2024-02-22 | End: 2024-02-27

## 2024-02-22 RX ADMIN — BACLOFEN 20 MG: 10 TABLET ORAL at 06:32

## 2024-02-22 RX ADMIN — FAMOTIDINE 20 MG: 20 TABLET, FILM COATED ORAL at 09:52

## 2024-02-22 RX ADMIN — ACETAMINOPHEN 650 MG: 325 TABLET ORAL at 02:53

## 2024-02-22 RX ADMIN — OSELTAMAVIR PHOSPHATE 75 MG: 75 CAPSULE ORAL at 09:52

## 2024-02-22 RX ADMIN — PHENOBARBITAL 113.4 MG: 32.4 TABLET ORAL at 09:52

## 2024-02-22 RX ADMIN — Medication 1 TABLET: at 09:52

## 2024-02-22 RX ADMIN — Medication 15 ML: at 09:52

## 2024-02-22 RX ADMIN — BACLOFEN 20 MG: 10 TABLET ORAL at 12:54

## 2024-02-22 ASSESSMENT — PAIN SCALES - WONG BAKER: WONGBAKER_NUMERICALRESPONSE: NO HURT

## 2024-02-22 NOTE — PROGRESS NOTES
02/22/24 1023   Discharge Planning   Patient expects to be discharged to: [facility]     Per previous TCC note, there must be a DR to  called report prior to dc, After chart review it appears that dc may be today, message sent to Dr Levy and bedside RN regarding nurse to nurse and DR to  contact numbers for report.   Lino will need to arrange transport, bedside RN also aware of this    ADOD today  DC back to [facility]    1435- spoke to rozina at [facility] to inform her that dr levy did attempt to call dr kang for report, apparently there is a nationwide outage with dr rodríguez phone carrier, but group is aware of dc today and transport estimated time.

## 2024-02-22 NOTE — CARE PLAN
Problem: Pain  Goal: My pain/discomfort is manageable  Outcome: Progressing     Problem: Safety  Goal: Patient will be injury free during hospitalization  Outcome: Progressing  Goal: I will remain free of falls  Outcome: Progressing     Problem: Daily Care  Goal: Daily care needs are met  Outcome: Progressing     Problem: Psychosocial Needs  Goal: Demonstrates ability to cope with hospitalization/illness  Outcome: Progressing  Goal: Collaborate with me, my family, and caregiver to identify my specific goals  Outcome: Progressing     Problem: Discharge Barriers  Goal: My discharge needs are met  Outcome: Progressing     Problem: Fall/Injury  Goal: Not fall by end of shift  Outcome: Progressing  Goal: Be free from injury by end of the shift  Outcome: Progressing     Problem: Skin  Goal: Decreased wound size/increased tissue granulation at next dressing change  Outcome: Progressing  Goal: Participates in plan/prevention/treatment measures  Outcome: Progressing  Goal: Prevent/manage excess moisture  Outcome: Progressing  Goal: Prevent/minimize sheer/friction injuries  Outcome: Progressing  Flowsheets (Taken 2/22/2024 0233)  Prevent/minimize sheer/friction injuries:   HOB 30 degrees or less   Use pull sheet   Turn/reposition every 2 hours/use positioning/transfer devices  Goal: Promote/optimize nutrition  Outcome: Progressing  Goal: Promote skin healing  Outcome: Progressing

## 2024-02-22 NOTE — PROGRESS NOTES
Cory Cobian is a 33 y.o. male on day 2 of admission presenting with Influenza A.      Subjective   No events overnight       Objective     Last Recorded Vitals  /85   Pulse 92   Temp 36.8 °C (98.2 °F)   Resp 19   Wt (!) 44 kg (97 lb)   SpO2 94%   Intake/Output last 3 Shifts:    Intake/Output Summary (Last 24 hours) at 2/22/2024 1057  Last data filed at 2/22/2024 0600  Gross per 24 hour   Intake 700 ml   Output 600 ml   Net 100 ml         Admission Weight  Weight: (!) 44 kg (97 lb) (02/21/24 1543)    Daily Weight  02/21/24 : (!) 44 kg (97 lb)    Image Results  ECG 12 lead  Sinus tachycardia  Otherwise normal ECG  No previous ECGs available  See ED provider note for full interpretation and clinical correlation  Confirmed by Jada Byers ([id number]) on 2/21/2024 4:41:15 PM      Physical Exam  No distress  Chest fair air enty   Cvs regular  Abdo soft bs activ,e no masses, peg site ok  Relevant Results  Ext contracted      Assessment/Plan           Principal Problem:    Influenza A  Active Problems:    CP (cerebral palsy), spastic, quadriplegic (CMS/HCC)    Spastic hip dislocation    Seizure disorder (CMS/HCC)    Functional quadriplegia (CMS/HCC)    Cont with curren t  Pt is on RA  Will plan on dc to group home today  Called left message for dr pearl Helms MD

## 2024-02-23 ENCOUNTER — PATIENT OUTREACH (OUTPATIENT)
Dept: CARE COORDINATION | Facility: CLINIC | Age: 34
End: 2024-02-23
Payer: MEDICAID

## 2024-02-23 NOTE — PROGRESS NOTES
Discharge facility: [facility]  Discharge diagnosis: Influenza A  Admission date: 2/20/24  Discharge date: 2/22/24  PCP Appointment Date: 3/5/24    Outreach call to patient to support a smooth transition of care from recent admission.  Left voicemail message for patient with my contact information [phone].  Enrolled patient in [employer] chatbot for additional support and patient education through transition period.  Will continue to monitor through transition period.     Samanta Sims RN

## 2024-03-05 ENCOUNTER — APPOINTMENT (OUTPATIENT)
Dept: UROLOGY | Facility: CLINIC | Age: 34
End: 2024-03-05
Payer: MEDICAID

## 2024-03-25 ENCOUNTER — APPOINTMENT (OUTPATIENT)
Dept: UROLOGY | Facility: CLINIC | Age: 34
End: 2024-03-25
Payer: MEDICAID

## 2024-04-03 ENCOUNTER — APPOINTMENT (OUTPATIENT)
Dept: RADIOLOGY | Facility: HOSPITAL | Age: 34
End: 2024-04-03
Payer: MEDICAID

## 2024-04-03 ENCOUNTER — HOSPITAL ENCOUNTER (EMERGENCY)
Facility: HOSPITAL | Age: 34
Discharge: HOME | End: 2024-04-03
Attending: EMERGENCY MEDICINE
Payer: MEDICAID

## 2024-04-03 VITALS
RESPIRATION RATE: 18 BRPM | WEIGHT: 98 LBS | BODY MASS INDEX: 19.14 KG/M2 | DIASTOLIC BLOOD PRESSURE: 96 MMHG | OXYGEN SATURATION: 95 % | SYSTOLIC BLOOD PRESSURE: 129 MMHG | HEART RATE: 89 BPM | TEMPERATURE: 97.8 F

## 2024-04-03 DIAGNOSIS — L98.9 SKIN LESION OF SCALP: Primary | ICD-10-CM

## 2024-04-03 LAB
ALBUMIN SERPL BCP-MCNC: 4.3 G/DL (ref 3.4–5)
ALP SERPL-CCNC: 136 U/L (ref 33–120)
ALT SERPL W P-5'-P-CCNC: 16 U/L (ref 10–52)
ANION GAP SERPL CALC-SCNC: 13 MMOL/L (ref 10–20)
AST SERPL W P-5'-P-CCNC: 14 U/L (ref 9–39)
BASOPHILS # BLD AUTO: 0.02 X10*3/UL (ref 0–0.1)
BASOPHILS NFR BLD AUTO: 0.3 %
BILIRUB SERPL-MCNC: 0.3 MG/DL (ref 0–1.2)
BUN SERPL-MCNC: 12 MG/DL (ref 6–23)
CALCIUM SERPL-MCNC: 9.4 MG/DL (ref 8.6–10.3)
CHLORIDE SERPL-SCNC: 97 MMOL/L (ref 98–107)
CO2 SERPL-SCNC: 32 MMOL/L (ref 21–32)
CREAT SERPL-MCNC: 0.6 MG/DL (ref 0.5–1.3)
EGFRCR SERPLBLD CKD-EPI 2021: >90 ML/MIN/1.73M*2
EOSINOPHIL # BLD AUTO: 0.14 X10*3/UL (ref 0–0.7)
EOSINOPHIL NFR BLD AUTO: 2.3 %
ERYTHROCYTE [DISTWIDTH] IN BLOOD BY AUTOMATED COUNT: 12.2 % (ref 11.5–14.5)
GLUCOSE SERPL-MCNC: 110 MG/DL (ref 74–99)
HCT VFR BLD AUTO: 44.5 % (ref 41–52)
HGB BLD-MCNC: 14.8 G/DL (ref 13.5–17.5)
IMM GRANULOCYTES # BLD AUTO: 0.02 X10*3/UL (ref 0–0.7)
IMM GRANULOCYTES NFR BLD AUTO: 0.3 % (ref 0–0.9)
LYMPHOCYTES # BLD AUTO: 1.42 X10*3/UL (ref 1.2–4.8)
LYMPHOCYTES NFR BLD AUTO: 23.7 %
MCH RBC QN AUTO: 31.8 PG (ref 26–34)
MCHC RBC AUTO-ENTMCNC: 33.3 G/DL (ref 32–36)
MCV RBC AUTO: 96 FL (ref 80–100)
MONOCYTES # BLD AUTO: 0.58 X10*3/UL (ref 0.1–1)
MONOCYTES NFR BLD AUTO: 9.7 %
NEUTROPHILS # BLD AUTO: 3.82 X10*3/UL (ref 1.2–7.7)
NEUTROPHILS NFR BLD AUTO: 63.7 %
NRBC BLD-RTO: 0 /100 WBCS (ref 0–0)
PLATELET # BLD AUTO: 290 X10*3/UL (ref 150–450)
POTASSIUM SERPL-SCNC: 4 MMOL/L (ref 3.5–5.3)
PROT SERPL-MCNC: 7.8 G/DL (ref 6.4–8.2)
RBC # BLD AUTO: 4.66 X10*6/UL (ref 4.5–5.9)
SODIUM SERPL-SCNC: 138 MMOL/L (ref 136–145)
WBC # BLD AUTO: 6 X10*3/UL (ref 4.4–11.3)

## 2024-04-03 PROCEDURE — 85025 COMPLETE CBC W/AUTO DIFF WBC: CPT

## 2024-04-03 PROCEDURE — 2550000001 HC RX 255 CONTRASTS: Performed by: EMERGENCY MEDICINE

## 2024-04-03 PROCEDURE — 84075 ASSAY ALKALINE PHOSPHATASE: CPT

## 2024-04-03 PROCEDURE — 70460 CT HEAD/BRAIN W/DYE: CPT | Performed by: RADIOLOGY

## 2024-04-03 PROCEDURE — 99284 EMERGENCY DEPT VISIT MOD MDM: CPT

## 2024-04-03 PROCEDURE — 36415 COLL VENOUS BLD VENIPUNCTURE: CPT

## 2024-04-03 PROCEDURE — 70460 CT HEAD/BRAIN W/DYE: CPT

## 2024-04-03 RX ORDER — MUPIROCIN CALCIUM 20 MG/G
CREAM TOPICAL 3 TIMES DAILY
Qty: 15 G | Refills: 0 | Status: SHIPPED | OUTPATIENT
Start: 2024-04-03 | End: 2024-04-13

## 2024-04-03 RX ADMIN — IOHEXOL 50 ML: 350 INJECTION, SOLUTION INTRAVENOUS at 03:12

## 2024-04-03 NOTE — ED PROVIDER NOTES
HPI   Chief Complaint   Patient presents with    Abscess       HPI  HISTORY OF PRESENT ILLNESS:  33 y.o. male presenting to the ED via EMS from a group home with complaint of a possible boil or abscess on the posterior head.  Patient has history of cerebral palsy, quadriplegia, nonverbal, is in a group home.  Has a G-tube.  Per EMS, staff noticed a boil or lesion that began spontaneously bleeding.  No known injury or trauma to the area.  Has apparently been present for a long time.  After they noticed it was bleeding, they called EMS to transport the patient to the ED.  History is very limited due to the patient being nonverbal.  He does not appear to be in significant pain and is not in significant pain when the area is palpated.  No anticoagulant or antiplatelet agents noted in his history.  He does bite and chew on his hands, noted on chart review that this is chronic and he has lesions and nodules on his hands from biting and chewing them.    Unable to obtain ROS, patient nonverbal.    PMH: cerebral palsy, quadriplegia, nonverbal, seizure disorder  Past Medical History:   Diagnosis Date    Cerebral palsy (CMS/HCC)     from [facility] Transfer Sheet    Intellectual disability     Other conditions influencing health status     Pneumonia    Seizures (CMS/HCC)     Spastic quadriplegia (CMS/HCC)     From [facility] Transfer Sheet         Abnormal Labs Reviewed - No abnormal labs to display   CT head w IV contrast    (Results Pending)               No data recorded                   Patient History   Past Medical History:   Diagnosis Date    Cerebral palsy (CMS/HCC)     from [facility] Transfer Sheet    Intellectual disability     Other conditions influencing health status     Pneumonia    Seizures (CMS/HCC)     Spastic quadriplegia (CMS/HCC)     From [facility] Transfer Sheet     History reviewed. No pertinent surgical history.  Family History   Problem Relation Name Age of Onset    No Known Problems  Mother       Social History     Tobacco Use    Smoking status: Never    Smokeless tobacco: Never   Substance Use Topics    Alcohol use: Not on file    Drug use: Not on file       Physical Exam   ED Triage Vitals [04/03/24 0012]   Temperature Heart Rate Respirations BP   36.6 °C (97.8 °F) 96 14 (!) 150/94      Pulse Ox Temp Source Heart Rate Source Patient Position   97 % Temporal Monitor Sitting      BP Location FiO2 (%)     Left arm --       Physical Exam  Constitutional:       General: He is not in acute distress.     Appearance: He is not toxic-appearing.   HENT:      Nose: No congestion.      Mouth/Throat:      Mouth: Mucous membranes are moist.   Eyes:      General: No scleral icterus.     Extraocular Movements: Extraocular movements intact.      Pupils: Pupils are equal, round, and reactive to light.   Cardiovascular:      Rate and Rhythm: Normal rate and regular rhythm.      Pulses: Normal pulses.   Pulmonary:      Effort: Pulmonary effort is normal. No respiratory distress.      Breath sounds: Normal breath sounds.   Abdominal:      General: There is no distension.      Palpations: Abdomen is soft.   Musculoskeletal:         General: No swelling.      Cervical back: Neck supple. No rigidity or tenderness.   Lymphadenopathy:      Cervical: No cervical adenopathy.   Skin:     General: Skin is warm and dry.      Capillary Refill: Capillary refill takes less than 2 seconds.      Comments: +Pedunculated lesion on posterior scalp, dried blood, no active bleeding. There is no fluctuance, no induration, no erythema.  Patient not grimacing or wincing when palpated, does not appear to be overtly tender.  No vesicles or bulla.  No sloughing of skin.  No purulent drainage.  Lesions noted on hands from biting.   Neurological:      Mental Status: He is alert.      Comments: Chronic contractures of extremities.   Psychiatric:      Comments: Nonverbal at baseline.       ED Course & MDM   Diagnoses as of 04/03/24 6131   Skin  lesion of scalp       Medical Decision Making  ED course / MDM     Summary:  Patient presented with a lesion on the scalp that began bleeding today.  Patient is nonverbal, presents from a group home, but apparently the lesion has been present for some time.  Vital signs stable.  Pedunculated lesion with dried blood, no active bleeding.  There is no erythema, induration, warmth, or fluctuance, no purulence.  Does not appear consistent with an abscess.  Labs reassuring, no leukocytosis, normal hemoglobin, no significant electrode abnormalities, normal kidney and liver function.  CT head shows no acute process, stable chronic changes. Patient case discussed with ED attending Dr. Shaw, who also saw and evaluated the patient.  Lesion appears less likely infectious in origin, more consistent with a skin lesion such as a skin tag or possible neoplasm that may have been superficially injured or partially torn causing the bleeding.  Patient requires dermatology follow-up.  Mupirocin was sent to his pharmacy to cover for any infection that may develop.  Will defer oral antibiotics at this time.  Discharge paperwork was given with instructions for follow-up with PCP and dermatology.      Impression:  1. See diagnosis    Plan: Homegoing.       Disposition: Discharge    Patient seen and discussed with Dr. Shaw    This note has been transcribed using voice recognition and may contain grammatical errors, misplaced words, incorrect words, incorrect phrases or other errors.   Procedure  Procedures     Kasey Chavis PA-C  04/03/24 5771

## 2024-04-03 NOTE — ED TRIAGE NOTES
"Patient arrives from Group-Home via [facility] with a primary complaint of a \"Boil\" (or abscess) on the Posterior Head that mysteriously ruptured. EMS states that the Patient's bleeding was controlled by the time they arrived. The Patient is not currently known to be on Blood Thinners. He has a baseline developmental disability. Group-Home Staff reportedly wanted the Patient evaluated in the ED.  "

## 2024-04-03 NOTE — DISCHARGE INSTRUCTIONS
Your labs and CT did not show any concerning abnormalities.  Need to follow up with dermatology for further evaluation of this scalp lesion. Please see your PCP in the next 2-3 days.   We do not think this is an infection, but we sent mupirocin cream to your pharmacy to cover for infection.

## 2024-04-11 NOTE — DISCHARGE SUMMARY
Discharge Diagnosis  Influenza A    Issues Requiring Follow-Up  influenza    Discharge Meds     Your medication list        START taking these medications        Instructions Last Dose Given Next Dose Due   guaiFENesin 600 mg 12 hr tablet  Commonly known as: Mucinex      Take 1 tablet (600 mg) by mouth every 12 hours if needed for congestion for up to 5 days. Do not crush, chew, or split.       oseltamivir 75 mg capsule  Commonly known as: Tamiflu      1 capsule (75 mg) by nasogastric tube route 2 times a day for 6 doses.              CONTINUE taking these medications        Instructions Last Dose Given Next Dose Due   acetaminophen 325 mg tablet  Commonly known as: Tylenol           albuterol 90 mcg/actuation inhaler           baclofen 20 mg tablet  Commonly known as: Lioresal           bisacodyl 10 mg suppository  Commonly known as: Dulcolax           calcium carbonate-vitamin D3 500 mg-5 mcg (200 unit) tablet           CENTRUM ORAL           Diastat AcuDiaL 5-7.5-10 mg rectal kit  Generic drug: diazePAM           ibuprofen 100 mg/5 mL suspension           MIRALAX ORAL           PHENobarbitaL 100 mg tablet  Commonly known as: Luminal      Take 1 tablet (100 mg) by mouth 2 times a day.       PHENobarbitaL 15 mg tablet  Commonly known as: Luminal      Take 1 tablet (15 mg) by mouth 2 times a day. Take one tablet twice daily along with 100mg BID       simethicone 125 mg chewable tablet  Commonly known as: Mylicon                     Where to Get Your Medications        These medications were sent to [facility] - [place], [place] - [address]  [address], [place] [address]      Phone: [phone]   oseltamivir 75 mg capsule       Information about where to get these medications is not yet available    Ask your nurse or doctor about these medications  guaiFENesin 600 mg 12 hr tablet         Test Results Pending At Discharge  Pending Labs       No current pending labs.             Hospital Course  pt admited to hospital for influenza     Influenza A  Active Problems:    CP (cerebral palsy), spastic, quadriplegic (CMS/HCC)    Spastic hip dislocation    Seizure disorder (CMS/HCC)    Functional quadriplegia (CMS/HCC)    He improved and will be discharged to group home    Pertinent Physical Exam At Time of Discharge      Outpatient Follow-Up  Future Appointments   Date Time Provider Department Center   4/22/2024 10:30 AM Paolo Delgadillo MD [id number]   4/27/2024 10:40 AM Susan L Mayne, APRN-CNP [id number]         Ramón Helms MD

## 2024-04-21 DIAGNOSIS — N20.0 KIDNEY STONES: ICD-10-CM

## 2024-04-27 ENCOUNTER — OFFICE VISIT (OUTPATIENT)
Dept: DERMATOLOGY | Facility: CLINIC | Age: 34
End: 2024-04-27
Payer: MEDICAID

## 2024-04-27 DIAGNOSIS — D48.5 NEOPLASM OF UNCERTAIN BEHAVIOR OF SKIN: Primary | ICD-10-CM

## 2024-04-27 PROCEDURE — 99203 OFFICE O/P NEW LOW 30 MIN: CPT | Performed by: NURSE PRACTITIONER

## 2024-04-27 PROCEDURE — 88305 TISSUE EXAM BY PATHOLOGIST: CPT | Performed by: DERMATOLOGY

## 2024-04-27 PROCEDURE — 1036F TOBACCO NON-USER: CPT | Performed by: NURSE PRACTITIONER

## 2024-04-27 NOTE — PROGRESS NOTES
Subjective     Cory Cobian is a 33 y.o. male who presents for the following: Suspicious Skin Lesion (Lesion to posterior neck.).     Review of Systems:  No other skin or systemic complaints other than what is documented elsewhere in the note.    The following portions of the chart were reviewed this encounter and updated as appropriate:   Tobacco  Allergies  Meds  Problems  Med Hx  Surg Hx  Fam Hx         Skin Cancer History  No skin cancer on file.      Specialty Problems    None       Objective   Well appearing patient in no apparent distress; mood and affect are within normal limits.    A focused skin examination was performed. All findings within normal limits unless otherwise noted below.    Assessment/Plan   1. Neoplasm of uncertain behavior of skin  Mid Occipital Scalp  2.0cm pedunculated nodule, underlying mild erythematous patch. History of cerebral palsy, quadriplegia, G-tube, non-verbal in group home. Unsure how long the lesion has been present or its behavior.           Lesion biopsy  Type of biopsy: tangential    Informed consent: discussed and consent obtained    Timeout: patient name, date of birth, surgical site, and procedure verified    Procedure prep:  Patient was prepped and draped  Anesthesia: the lesion was anesthetized in a standard fashion    Anesthetic:  1% lidocaine w/ epinephrine 1-100,000 local infiltration  Instrument used: DermaBlade    Hemostasis achieved with: aluminum chloride    Outcome: patient tolerated procedure well    Post-procedure details: sterile dressing applied and wound care instructions given    Dressing type: petrolatum and bandage      Specimen 1 - Dermatopathology- DERM LAB  Differential Diagnosis: nevus vs trauma vs nmsc  Check Margins Yes/No?:    Comments:    Dermpath Lab: Routine Histopathology (formalin-fixed tissue)    -  Discussed differential with patient.   - Given uncertainty of clinical diagnosis, a biopsy is recommended in clinic today.   - The  patient expressed understanding, is in agreement with this plan, and wishes to proceed with biopsy.   - Oral and written wound care instructions provided.   - Advised the patient that the office will call within 2 weeks to discuss biopsy results.

## 2024-05-01 LAB
LABORATORY COMMENT REPORT: NORMAL
PATH REPORT.FINAL DX SPEC: NORMAL
PATH REPORT.GROSS SPEC: NORMAL
PATH REPORT.RELEVANT HX SPEC: NORMAL
PATH REPORT.TOTAL CANCER: NORMAL

## 2024-05-02 NOTE — RESULT ENCOUNTER NOTE
Contacted pt nurse and notified of benign results at this time.  No further questions noted.      Claudia Zelaya RN 
coated tsp x2, ice chips x2/moderately thick

## 2024-06-20 ENCOUNTER — APPOINTMENT (OUTPATIENT)
Dept: UROLOGY | Facility: CLINIC | Age: 34
End: 2024-06-20
Payer: MEDICAID

## 2024-06-20 VITALS — TEMPERATURE: 97 F

## 2024-06-20 DIAGNOSIS — N20.0 KIDNEY STONES: Primary | ICD-10-CM

## 2024-06-20 PROCEDURE — 1036F TOBACCO NON-USER: CPT | Performed by: STUDENT IN AN ORGANIZED HEALTH CARE EDUCATION/TRAINING PROGRAM

## 2024-06-20 PROCEDURE — 99214 OFFICE O/P EST MOD 30 MIN: CPT | Performed by: STUDENT IN AN ORGANIZED HEALTH CARE EDUCATION/TRAINING PROGRAM

## 2024-06-20 NOTE — PROGRESS NOTES
Scribed for Dr. Paolo Delgadillo by Michael Lucas. I, Dr. Paolo Delgadillo have personally reviewed and agreed with the information entered by the Virtual Scribe. 06/20/24.    ASSESSMENT:  Problem List Items Addressed This Visit       Kidney stones - Primary    Relevant Orders    XR abdomen 1 view      PLAN:  #Nephrolithiasis  CT results reviewed with Caregiver.   Current renal stones may be too large to pass.   Suspect he will require surgical intervention eventually for removal.   Discussed options, elects to continue stone surveillance for now.  Advised to call if he becomes acutely symptomatic.   Discussed stone prevention; referred for a nutrition consult.   RTC in 6 months with a KUB prior to.     We discussed that most calculi under 5 mm can be safely observed. This recommendation is based on large series looking at spontaneous passage rates that suggest that the likelihood of stone passage is highest for stones under 4 mm size (approximately 70-80%), moderate for stones between 4 and 6 mm (50%), and lowest for stones 6 mm or greater (20-30%).    I discussed with the patient the management options for kidney and ureteral stones, including observation, medical expulsive therapy, stent placement, rigid or flexible ureteroscopy with stone basket or laser stone fragmentation, ESWL; as well as advanced options for larger stones such as percutaneous and surgical approaches.    Patient was educated on stone prevention dietary modifications which include increased water intake, citric acid supplementation in the form of lemon juice, low oxalate diet, moderate protein intake and low sodium intake. In addition, suggested avoiding dark-colored sodas. A daily urine output of 2.5 L is also recommended if feasible.     #Constipation  Patient appears to be significantly constipated and impacted.  Advised that this needs to be addressed prior to being considered for surgery.   Encouraged to follow up with PCP for this.     All  questions were answered to the patient’s satisfaction.  Patient agrees with the plan and wishes to proceed.  Continue follow-up for ongoing care of his chronic medical conditions.       History of Present Illness (HPI):  Cory presents for a follow up visit.  The patient’s EMR has been reviewed.  Lives in [place], [place].  Has lived in a Group Home for the last 8-9 years.  Accompanied by the Manager of the group home whom is the primary historian.     Hx of cerebral palsy and nephrolithiasis, urinary incontinence and epilepsy.   Daily medications include phenobarbital daily.   Wears adult depends for his incontinence.   Previously evaluated by Dr. Hunt and Marii Eric.  Referred to me 2/2 concerns of forming bladder stones.      CT A&P (11/02/23) personally reviewed.  Multiple bilateral non-obstructing renal calculi;  measuring up to 8 mm in the LLP.   No hydronephrosis.  Rectal dilation and extensive colonic stool.    KUB (12/14/23) personally reviewed.   Redemonstration of findings of known nephrolithiasis  Located LLP, up to 8 mm.   Additional smaller nephroliths noted on the previous CT are not well appreciated due to radiographic modality.    TODAY: (06/20/24)  Last visit, elected to continue stone surveillance.   However, no recent interval imaging performed. (No recent KUB)  Denies any acute or worsening complaints.   Has not passed any stones since last visit.   Denies any recent infections, gross hematuria, flank pain, or fevers.     TO REVIEW: Last visit (12/05/23)  Per Caregiver/.  Patient has been doing well overall.  No acute or worsening complaints.   He does not appear to be in any discomfort.   Latest CT results reviewed with Caregiver today.      TO REVIEW: Initial visit (10/02/23)  Per Caregiver/:  Reports the patient passed a small amount of gross hematuria.   Occurred about 10 days ago.   Spontaneously resolved.   Has not recurred since.   Patient did not  appear in any pain or discomfort.   Denies any recent UTIs or fevers.     Last CT imaging ((May 2022) was personally reviewed showed:  - bladder wall thickening, but no stones visualized.      Latest RBUS (05/11/23) showed:  - possible non-obstructing stones and bladder stones.     Last Cr 0.67 (July 11, 2023)    Past Medical History:   Diagnosis Date    Cerebral palsy (Multi)     from [facility] Transfer Sheet    Intellectual disability     Other conditions influencing health status     Pneumonia    Seizures (Multi)     Spastic quadriplegia (Multi)     From [facility] Transfer Sheet     History reviewed. No pertinent surgical history.  Family History   Problem Relation Name Age of Onset    No Known Problems Mother       Social History     Tobacco Use   Smoking Status Never   Smokeless Tobacco Never     Current Outpatient Medications   Medication Sig Dispense Refill    acetaminophen (Tylenol) 325 mg tablet 2 tablets (650 mg) by g-tube route every 4 hours if needed for mild pain (1 - 3).      albuterol 90 mcg/actuation inhaler Inhale 2 puffs 4 times a day as needed.      baclofen (Lioresal) 20 mg tablet Take 1 tablet (20 mg) by mouth 4 times a day.      bisacodyl (Dulcolax) 10 mg suppository Insert 1 suppository (10 mg) into the rectum every 3rd day if needed for constipation.      calcium carbonate-vitamin D3 500 mg-5 mcg (200 unit) tablet 1 tablet by g-tube route once daily.      diazePAM (Diastat AcuDiaL) 5-7.5-10 mg rectal kit Insert 10 mg into the rectum every 6 hours if needed for seizures. 5-7.5-10mg rectal kit      ibuprofen 100 mg/5 mL suspension 20 mL (400 mg) by g-tube route every 6 hours if needed for fever (temp greater than 38.0 C).      multivitamin/iron/folic acid (CENTRUM ORAL) 15 mL by peg tube route early in the morning.. LIQUID      PHENobarbitaL (Luminal) 100 mg tablet Take 1 tablet (100 mg) by mouth 2 times a day. 60 tablet 5    PHENobarbitaL (Luminal) 15 mg tablet Take 1 tablet (15 mg)  by mouth 2 times a day. Take one tablet twice daily along with 100mg BID 60 tablet 5    polyethylene glycol 3350 (MIRALAX ORAL) 17 g by peg tube route twice a day. 17g  in 150ml water via g-tube      simethicone (Mylicon) 125 mg chewable tablet 1 tablet (125 mg) by g-tube route every 6 hours if needed for flatulence.       No current facility-administered medications for this visit.     No Known Allergies  Past medical, surgical, family and social history in the chart was reviewed and is accurate including any additions to what is in this HPI.    REVIEW OF SYSTEMS (ROS):   Constitutional: denies any unintentional weight loss or change in strength.  Integumentary: denies any rashes or pruritus.  Eyes: denies any double vision or eye pain.  Ear/Nose/Mouth/Throat: denies any nosebleeds or gum bleeds.  Cardiovascular: denies any chest pain or syncope.  Respiratory: denies hemoptysis.  Gastrointestinal: denies nausea or vomiting.  Musculoskeletal: denies muscle cramping or weakness.  Neurologic: denies convulsions or seizures.  Hematologic/Lymphatic: denies bleeding tendencies.  Endocrine: denies heat/cold intolerance.  All other systems have been reviewed and are negative unless otherwise noted in the HPI.     OBJECTIVE:  Visit Vitals  Temp 36.1 °C (97 °F)     PHYSICAL EXAM:  Constitutional: No obvious distress.  Eyes: Non-injected conjunctiva, sclera clear, EOMI.  Ears/Nose/Mouth/Throat: No obvious drainage per ears or nose.  Cardiovascular: Extremities are warm and well perfused. No edema, cyanosis or pallor.  Respiratory: No audible wheezing/stridor; respirations do not appear labored.  Gastrointestinal: Abdomen soft, not distended.  Musculoskeletal: Normal ROM of extremities.  Skin: No obvious rashes or open sores.  Neurologic: Alert and oriented, CN 2-12 grossly intact.  Psychiatric: Answers questions appropriately with normal affect.  Hematologic/Lymphatic/Immunologic: No obvious bruises or sites of spontaneous  bleeding.  Genitourinary: No CVA tenderness, bladder not palpable.     LABS & IMAGING:  Lab Results   Component Value Date    WBC 6.0 04/03/2024    HGB 14.8 04/03/2024    HCT 44.5 04/03/2024     04/03/2024    CHOL 187 07/20/2021    TRIG 40 07/20/2021    HDL 65.5 07/20/2021    ALT 16 04/03/2024    AST 14 04/03/2024     04/03/2024    K 4.0 04/03/2024    CL 97 (L) 04/03/2024    CREATININE 0.60 04/03/2024    BUN 12 04/03/2024    CO2 32 04/03/2024    INR 1.2 (H) 12/29/2022    HGBA1C 5.5 02/06/2024     Scribed for Dr. Paolo Delgadillo by Michael Lucas.  I, Dr. Paolo Delgadillo have personally reviewed and agreed with the information entered by the Virtual Scribe. 06/20/24.

## 2024-07-11 ENCOUNTER — PREP FOR PROCEDURE (OUTPATIENT)
Dept: POST ACUTE CARE | Facility: EXTERNAL LOCATION | Age: 34
End: 2024-07-11

## 2024-07-11 ENCOUNTER — APPOINTMENT (OUTPATIENT)
Dept: POST ACUTE CARE | Facility: EXTERNAL LOCATION | Age: 34
End: 2024-07-11
Payer: MEDICAID

## 2024-07-11 DIAGNOSIS — M24.359 SPASTIC HIP DISLOCATION, UNSPECIFIED LATERALITY: ICD-10-CM

## 2024-07-11 DIAGNOSIS — G80.0 CP (CEREBRAL PALSY), SPASTIC, QUADRIPLEGIC (MULTI): Primary | ICD-10-CM

## 2024-07-11 PROCEDURE — 99308 SBSQ NF CARE LOW MDM 20: CPT | Performed by: ORTHOPAEDIC SURGERY

## 2024-07-11 NOTE — PROGRESS NOTES
Previous:  Cory is a 33-year-old male with a year history of cerebral palsy spastic quadriplegia who presents today as follow-up from his most recent left hip steroid injection done on February 9, 2022. He tolerated this very well and is still seeing pain relief from it. The staff reports that he has been comfortable sitting in his chair for longer (up to 2.5 hours), and tolerates both back and side-lying for longer periods of time except a recent period of discomfort on his R side with right side lying.     Today: There has been some notable pain in his right hip, especially with movement.  He is tolerating less time sitting upright in his chair.  Prone causes discomfort.  It has been two years since his last hip injections.    Past Medical History:   Diagnosis Date    Cerebral palsy (Multi)     from [facility] Transfer Sheet    Intellectual disability     Other conditions influencing health status     Pneumonia    Seizures (Multi)     Spastic quadriplegia (Multi)     From [facility] Transfer Sheet     No past surgical history on file.    Current Outpatient Medications:     acetaminophen (Tylenol) 325 mg tablet, 2 tablets (650 mg) by g-tube route every 4 hours if needed for mild pain (1 - 3)., Disp: , Rfl:     albuterol 90 mcg/actuation inhaler, Inhale 2 puffs 4 times a day as needed., Disp: , Rfl:     baclofen (Lioresal) 20 mg tablet, Take 1 tablet (20 mg) by mouth 4 times a day., Disp: , Rfl:     bisacodyl (Dulcolax) 10 mg suppository, Insert 1 suppository (10 mg) into the rectum every 3rd day if needed for constipation., Disp: , Rfl:     calcium carbonate-vitamin D3 500 mg-5 mcg (200 unit) tablet, 1 tablet by g-tube route once daily., Disp: , Rfl:     diazePAM (Diastat AcuDiaL) 5-7.5-10 mg rectal kit, Insert 10 mg into the rectum every 6 hours if needed for seizures. 5-7.5-10mg rectal kit, Disp: , Rfl:     ibuprofen 100 mg/5 mL suspension, 20 mL (400 mg) by g-tube route every 6 hours if needed for fever  (temp greater than 38.0 C)., Disp: , Rfl:     multivitamin/iron/folic acid (CENTRUM ORAL), 15 mL by peg tube route early in the morning.. LIQUID, Disp: , Rfl:     PHENobarbitaL (Luminal) 100 mg tablet, Take 1 tablet (100 mg) by mouth 2 times a day., Disp: 60 tablet, Rfl: 5    PHENobarbitaL (Luminal) 15 mg tablet, Take 1 tablet (15 mg) by mouth 2 times a day. Take one tablet twice daily along with 100mg BID, Disp: 60 tablet, Rfl: 5    polyethylene glycol 3350 (MIRALAX ORAL), 17 g by peg tube route twice a day. 17g  in 150ml water via g-tube, Disp: , Rfl:     simethicone (Mylicon) 125 mg chewable tablet, 1 tablet (125 mg) by g-tube route every 6 hours if needed for flatulence., Disp: , Rfl:   No Known Allergies  Family History   Problem Relation Name Age of Onset    No Known Problems Mother       Social History     Socioeconomic History    Marital status: Single     Spouse name: Not on file    Number of children: Not on file    Years of education: Not on file    Highest education level: Not on file   Occupational History    Not on file   Tobacco Use    Smoking status: Never    Smokeless tobacco: Never   Substance and Sexual Activity    Alcohol use: Not on file    Drug use: Not on file    Sexual activity: Not on file   Other Topics Concern    Not on file   Social History Narrative    Not on file     Social Determinants of Health     Financial Resource Strain: Low Risk  (2/20/2024)    Overall Financial Resource Strain (CARDIA)     Difficulty of Paying Living Expenses: Not hard at all   Food Insecurity: Not on file   Transportation Needs: No Transportation Needs (2/20/2024)    PRAPARE - Transportation     Lack of Transportation (Medical): No     Lack of Transportation (Non-Medical): No   Physical Activity: Not on file   Stress: Not on file   Social Connections: Not on file   Intimate Partner Violence: Not on file   Housing Stability: Low Risk  (2/20/2024)    Housing Stability Vital Sign     Unable to Pay for Housing in  the Last Year: No     Number of Places Lived in the Last Year: 1     Unstable Housing in the Last Year: No       Review of Systems: 16 systems reviewed with the patient and family.  All systems were negative except for the pertinent positives noted in the history above.    General: Well developed, well nourished male in no acute distress.    Skin: The skin is intact with no evidence of abrasions, bruises, or swelling.    Vascular: There are 2+ pulses in both lower extremities and brisk capillary refill.    Neuro: The light touch sensation is intact in both legs.    The left hip can be moved into some minimal abduction - only about 10 total degrees (more on the right) and he seems to have no pain. He has almost no hip flexion on the left, but can be fully extended. He also allows adduction, IR and ER on both sides, with no obvious pain today. His knees and ankles are stiff with very limited mobility. His spine is straight, I could abduct his shoulders to neutral and he kept his elbows pretty tightly held in some flexion. He had a lot of tightness when I tried to move his arms, tighter on the left than the right.  Overhead abduction was extremely limited.  I could flex and extend both elbows although not to full extension.  I can also pronate and supinate his forearm.  He does that spontaneously pretty well.  His spine is straight.     Assesment: Cory Cobian is a 33 y.o. male who has CPSQ and a history of hip arthritis.  His hips are bothering him again so we will plan to do bilateral hip steroid injections.  We will need to get consent from his guardian.  This will need to be done at [facility] because of his age.    His dad, Negrito Mcgee, is his guardian and his phone number is [phone] with an email address of [email or web address].  He can be sometimes difficult to reach by phone, so our residents should note on Friday in conference that we we will start calling him in a week in advance.

## 2024-07-16 ENCOUNTER — LAB REQUISITION (OUTPATIENT)
Dept: LAB | Facility: HOSPITAL | Age: 34
End: 2024-07-16
Payer: MEDICAID

## 2024-07-16 DIAGNOSIS — Z51.81 ENCOUNTER FOR THERAPEUTIC DRUG LEVEL MONITORING: ICD-10-CM

## 2024-07-16 DIAGNOSIS — Z79.899 OTHER LONG TERM (CURRENT) DRUG THERAPY: ICD-10-CM

## 2024-07-16 DIAGNOSIS — E55.9 VITAMIN D DEFICIENCY, UNSPECIFIED: ICD-10-CM

## 2024-07-16 LAB
25(OH)D3 SERPL-MCNC: 119 NG/ML (ref 30–100)
ALBUMIN SERPL BCP-MCNC: 4.1 G/DL (ref 3.4–5)
ALP SERPL-CCNC: 121 U/L (ref 33–120)
ALT SERPL W P-5'-P-CCNC: 24 U/L (ref 10–52)
ANION GAP SERPL CALC-SCNC: 11 MMOL/L (ref 10–20)
AST SERPL W P-5'-P-CCNC: 16 U/L (ref 9–39)
BASOPHILS # BLD AUTO: 0.04 X10*3/UL (ref 0–0.1)
BASOPHILS NFR BLD AUTO: 0.7 %
BILIRUB DIRECT SERPL-MCNC: 0 MG/DL (ref 0–0.3)
BILIRUB SERPL-MCNC: 0.2 MG/DL (ref 0–1.2)
BUN SERPL-MCNC: 12 MG/DL (ref 6–23)
CALCIUM SERPL-MCNC: 9.1 MG/DL (ref 8.6–10.3)
CHLORIDE SERPL-SCNC: 99 MMOL/L (ref 98–107)
CO2 SERPL-SCNC: 34 MMOL/L (ref 21–32)
CREAT SERPL-MCNC: 0.62 MG/DL (ref 0.5–1.3)
CRP SERPL-MCNC: 0.68 MG/DL
EGFRCR SERPLBLD CKD-EPI 2021: >90 ML/MIN/1.73M*2
EOSINOPHIL # BLD AUTO: 0.21 X10*3/UL (ref 0–0.7)
EOSINOPHIL NFR BLD AUTO: 3.4 %
ERYTHROCYTE [DISTWIDTH] IN BLOOD BY AUTOMATED COUNT: 12.5 % (ref 11.5–14.5)
FERRITIN SERPL-MCNC: 58 NG/ML (ref 20–300)
GLUCOSE SERPL-MCNC: 82 MG/DL (ref 74–99)
HCT VFR BLD AUTO: 46.7 % (ref 41–52)
HGB BLD-MCNC: 15.4 G/DL (ref 13.5–17.5)
HIV 1+2 AB+HIV1 P24 AG SERPL QL IA: NONREACTIVE
IMM GRANULOCYTES # BLD AUTO: 0 X10*3/UL (ref 0–0.7)
IMM GRANULOCYTES NFR BLD AUTO: 0 % (ref 0–0.9)
IRON SATN MFR SERPL: 26 % (ref 25–45)
IRON SERPL-MCNC: 80 UG/DL (ref 35–150)
LYMPHOCYTES # BLD AUTO: 1.2 X10*3/UL (ref 1.2–4.8)
LYMPHOCYTES NFR BLD AUTO: 19.5 %
MAGNESIUM SERPL-MCNC: 1.89 MG/DL (ref 1.6–2.4)
MCH RBC QN AUTO: 31.9 PG (ref 26–34)
MCHC RBC AUTO-ENTMCNC: 33 G/DL (ref 32–36)
MCV RBC AUTO: 97 FL (ref 80–100)
MONOCYTES # BLD AUTO: 0.58 X10*3/UL (ref 0.1–1)
MONOCYTES NFR BLD AUTO: 9.4 %
NEUTROPHILS # BLD AUTO: 4.12 X10*3/UL (ref 1.2–7.7)
NEUTROPHILS NFR BLD AUTO: 67 %
NRBC BLD-RTO: 0 /100 WBCS (ref 0–0)
PHENOBARB SERPL-MCNC: 33.4 UG/ML (ref 10–40)
PHOSPHATE SERPL-MCNC: 3.5 MG/DL (ref 2.5–4.9)
PLATELET # BLD AUTO: 304 X10*3/UL (ref 150–450)
POTASSIUM SERPL-SCNC: 4.3 MMOL/L (ref 3.5–5.3)
PROT SERPL-MCNC: 7.6 G/DL (ref 6.4–8.2)
RBC # BLD AUTO: 4.83 X10*6/UL (ref 4.5–5.9)
SODIUM SERPL-SCNC: 140 MMOL/L (ref 136–145)
TIBC SERPL-MCNC: 304 UG/DL (ref 240–445)
TRANSFERRIN SERPL-MCNC: 222 MG/DL (ref 200–360)
UIBC SERPL-MCNC: 224 UG/DL (ref 110–370)
WBC # BLD AUTO: 6.2 X10*3/UL (ref 4.4–11.3)

## 2024-07-16 PROCEDURE — 83550 IRON BINDING TEST: CPT | Mod: 59,OUT | Performed by: HOSPITALIST

## 2024-07-16 PROCEDURE — 82728 ASSAY OF FERRITIN: CPT | Mod: OUT | Performed by: HOSPITALIST

## 2024-07-16 PROCEDURE — 82306 VITAMIN D 25 HYDROXY: CPT | Mod: OUT | Performed by: HOSPITALIST

## 2024-07-16 PROCEDURE — 80184 ASSAY OF PHENOBARBITAL: CPT | Mod: OUT | Performed by: HOSPITALIST

## 2024-07-16 PROCEDURE — 83735 ASSAY OF MAGNESIUM: CPT | Mod: OUT | Performed by: HOSPITALIST

## 2024-07-16 PROCEDURE — 87389 HIV-1 AG W/HIV-1&-2 AB AG IA: CPT | Mod: OUT,PORLAB | Performed by: HOSPITALIST

## 2024-07-16 PROCEDURE — 84132 ASSAY OF SERUM POTASSIUM: CPT | Mod: OUT | Performed by: HOSPITALIST

## 2024-07-16 PROCEDURE — 84466 ASSAY OF TRANSFERRIN: CPT | Mod: OUT,PORLAB | Performed by: HOSPITALIST

## 2024-07-16 PROCEDURE — 82248 BILIRUBIN DIRECT: CPT | Mod: OUT | Performed by: HOSPITALIST

## 2024-07-16 PROCEDURE — 84100 ASSAY OF PHOSPHORUS: CPT | Mod: OUT | Performed by: HOSPITALIST

## 2024-07-16 PROCEDURE — 86140 C-REACTIVE PROTEIN: CPT | Mod: OUT | Performed by: HOSPITALIST

## 2024-07-16 PROCEDURE — 36415 COLL VENOUS BLD VENIPUNCTURE: CPT | Performed by: HOSPITALIST

## 2024-07-16 PROCEDURE — 85025 COMPLETE CBC W/AUTO DIFF WBC: CPT | Mod: OUT | Performed by: HOSPITALIST

## 2024-10-02 ENCOUNTER — OFFICE VISIT (OUTPATIENT)
Dept: NEUROLOGY | Facility: CLINIC | Age: 34
End: 2024-10-02
Payer: MEDICAID

## 2024-10-02 VITALS — SYSTOLIC BLOOD PRESSURE: 141 MMHG | HEART RATE: 100 BPM | RESPIRATION RATE: 20 BRPM | DIASTOLIC BLOOD PRESSURE: 100 MMHG

## 2024-10-02 DIAGNOSIS — G40.909 SEIZURE DISORDER (MULTI): Primary | ICD-10-CM

## 2024-10-02 PROCEDURE — 99214 OFFICE O/P EST MOD 30 MIN: CPT | Performed by: NURSE PRACTITIONER

## 2024-10-02 RX ORDER — HYDROGEN PEROXIDE 3 %
20 SOLUTION, NON-ORAL MISCELLANEOUS
COMMUNITY

## 2024-10-02 ASSESSMENT — PAIN SCALES - GENERAL: PAINLEVEL: 0-NO PAIN

## 2024-10-02 ASSESSMENT — ENCOUNTER SYMPTOMS: SEIZURES: 1

## 2024-10-02 NOTE — PROGRESS NOTES
Patient ID: Cory Cobian 34 y.o.male presenting in follow-up for epilepsy. severe cognitive impairment due to cerebral palsy, spastic quadriplegia, seizure disorders who presented to our clinic today for 6 month follow-up.     Seizures       Seizure History:  In March he had a cluster of abnormal movements (bilateral leg shakings) in one day concerning for seizures even though it was not his typical ones. He was brought to hospital and was given Diastat, Keppra on route.   We monitored him 24 hrs and there were no epileptiform discharges or seizures. He was discharged and we increased Phenobarbital from 100/100 to 113.4/113.4 mg (liquid formation) mainly for precautions as his events were not really sure for epileptic phenomenon.   In April, he had total 6 seizures, one of them lasted more than 30 minutes so Diastat was given.   In May, until now he already had 3 seizures, the first and second one lasted 10 to 15 minutes and the last one few days ago lasted about 30s.   The nurse didn't witness all his seizures, mentioning that most of them were his typical ones but some of them were just some regular shaking like behavior issues.   Overall, his seizures/events are more frequent than last year and the year before.   There were no clear trigger factors like fevers, infections, change of medications, sleep deprive noticed.          PRESENT CONCERNS:    He is accompanied by a health aide from his group home. Since his last visit he has had no seizures. His last seizures were in July 2021. Prior seizures were described as RUE shaking for 30 seconds- 1 minute. He has not needed any rescue diastat. Overall he is tolerating the increased dose of PB 115mg bid.. There are no reports of staring spells, memory lapses, waking up in the morning with bite marks on tongue or cheek, or unexplained bruises, or unexplained urinary incontinence upon awakening.      RECENT PB LEVELS:  01/2023: 45.5  4/2023: 38.8  7/2024:   33.4      Review of Systems   Neurological:  Positive for seizures.   All other systems reviewed and are negative.          RESULTS:  No EEG results found for the past 12 months    No results found for this or any previous visit (from the past 4464 hour(s)).        No MRI head results found for the past 12 months    CT head w IV contrast    Result Date: 4/3/2024  Interpreted By:  Vanessa Pastor, STUDY: CT HEAD W IV CONTRAST;  4/3/2024 3:17 am   INDICATION: Signs/Symptoms:lesion on posterior scalp, bleeding, eval for abscess.   COMPARISON: 03/26/2021   ACCESSION NUMBER(S): [id number]   ORDERING CLINICIAN: THAD ARRINGTON   TECHNIQUE: Contrast-enhanced imaging of the head.   FINDINGS: BRAIN PARENCHYMA: Extensive bilateral parenchymal calcifications are similar to prior imaging. Parenchymal volume loss and nonspecific white matter changes appear similar to prior imaging. No mass effect or midline shift. Eunice cystic changes or encephalomalacia along the lateral ventricles. No abnormal parenchymal enhancing lesion or collection identified.   HEMORRHAGE: No acute intracranial hemorrhage within constraints of the enhanced imaging. VENTRICLES and EXTRA-AXIAL SPACES: Redemonstrated dysmorphic appearance of the ventricles including ventriculomegaly with cavum septum pellucidum et vergae. Anterior frontal horn septations or eunice cystic changes. Left ventricular colpocephaly. EXTRACRANIAL SOFT TISSUES: Small exophytic skin nodule in the right suboccipital region measuring up to 1.4 cm. No scalp hematoma identified.. PARANASAL SINUSES/MASTOIDS: The visualized paranasal sinuses and mastoid air cells are aerated. CALVARIUM: No depressed skull fracture. Incidentally noted aeration of the petrous apex.   OTHER FINDINGS: None.         No abnormal enhancing parenchymal mild sore collection identified.   Redemonstrated chronic changes including extensive bilateral parenchymal calcifications which may be sequela of remote infection.    Similar appearance of parenchymal volume loss with ventriculomegaly and dysmorphic appearance of the ventricular system as described with probable periventricular regions of encephalomalacia.   MACRO: None.   Signed by: Vanessa Pastor 4/3/2024 3:30 AM Dictation workstation:   [id number]     No results found for this or any previous visit (from the past 4464 hour(s)).    Results for orders placed or performed during the hospital encounter of 04/03/24   CT head w IV contrast    Narrative    Interpreted By:  Vanessa Pastor,   STUDY:  CT HEAD W IV CONTRAST;  4/3/2024 3:17 am      INDICATION:  Signs/Symptoms:lesion on posterior scalp, bleeding, eval for abscess.      COMPARISON:  03/26/2021      ACCESSION NUMBER(S):  [id number]      ORDERING CLINICIAN:  THAD ARRINGTON      TECHNIQUE:  Contrast-enhanced imaging of the head.      FINDINGS:  BRAIN PARENCHYMA: Extensive bilateral parenchymal calcifications are  similar to prior imaging. Parenchymal volume loss and nonspecific  white matter changes appear similar to prior imaging. No mass effect  or midline shift. Eunice cystic changes or encephalomalacia along the  lateral ventricles. No abnormal parenchymal enhancing lesion or  collection identified.      HEMORRHAGE: No acute intracranial hemorrhage within constraints of  the enhanced imaging. VENTRICLES and EXTRA-AXIAL SPACES:  Redemonstrated dysmorphic appearance of the ventricles including  ventriculomegaly with cavum septum pellucidum et vergae. Anterior  frontal horn septations or eunice cystic changes. Left ventricular  colpocephaly. EXTRACRANIAL SOFT TISSUES: Small exophytic skin nodule  in the right suboccipital region measuring up to 1.4 cm. No scalp  hematoma identified.. PARANASAL SINUSES/MASTOIDS: The visualized  paranasal sinuses and mastoid air cells are aerated. CALVARIUM: No  depressed skull fracture. Incidentally noted aeration of the petrous  apex.      OTHER FINDINGS: None.            Impression    No  abnormal enhancing parenchymal mild sore collection identified.      Redemonstrated chronic changes including extensive bilateral  parenchymal calcifications which may be sequela of remote infection.      Similar appearance of parenchymal volume loss with ventriculomegaly  and dysmorphic appearance of the ventricular system as described with  probable periventricular regions of encephalomalacia.      MACRO:  None.      Signed by: Vanessa Pastor 4/3/2024 3:30 AM  Dictation workstation:   [id number]           Vitals:    10/02/24 1106   BP: (!) 141/100   Pulse: 100   Resp: 20       Neurologic Exam   non verbal   limited exam due to severe cognitive impairment and spastic quadriplegia   wheel chair bound        ASSESSMENT & PLAN:   34 y.o. male presenting in follow-up for peviously diagnosed epilepsy. Semiology as described above    Problem List Items Addressed This Visit       Seizure disorder (Multi) - Primary         Stable no seizures in 3 years   PB levels are stable, no changes in his medications - filled by doctor at [facility]   No diastat needed

## 2024-10-02 NOTE — PATIENT INSTRUCTIONS
"Thank you for coming to the Epilepsy Clinic today.    -If you have any sudden new, concerning or worsening symptoms, call 911 and go to the Emergency Room. Otherwise, it was good seeing you today-  -  Your seizures are well controlled on the current medication. Additional prescription refills was sent to the pharmacy.    As we discussed, because you have not had any events or seizures where you lose awareness or control of your actions you have no restrictions at this time. However, if you have an event of seizure where this were to occur, you must inform our office, and we will reassess things. In this event, you stop driving, using heavy machinery, climbing heights, or engaging in any other activity that would cause harm/death to yourself or others were you to lose awareness/consciousness and have a seizure. These restrictions would need to stay in place until at least 6 months of seizure freedom and clearance your physician.    -HOW TO CONTACT JOHNNY LOPEZ EPILEPSY NURSE PRACTITIONER ([phone]).   Instructed to call in the event of seizure, medication refills, or any questions  *Please allow 24-48 hours for non-urgent responses*.  For emergency concerns, please dial 911 or present to the nearest emergency room.  For concerns after business hours (8am-4:30pm) or on weekends please call [phone]  To call and schedule a follow up appointment please call [phone]  -Paperwork may take up to 3 business days to complete-    Every attempt is made to run on time for your appointment, if you are 15 minutes or later for your appoinement you may be asked to reschedule    -Compliance education: It is important to continue to try and achieve seizure control because of the potential for injury and illness due to seizures. In a very small minority of patients with generalized tonic clonic seizures (\"grand mal\"), breathing or heart function can stop during a seizure and result in demise (sudden unexpected death in " epilepsy or SUDEP). Freedom from seizures prevents this kind of outcome-

## 2024-10-17 ASSESSMENT — ENCOUNTER SYMPTOMS
ROS GI COMMENTS: PRESENCE OF G-TUBE
SEIZURES: 1

## 2024-10-30 ENCOUNTER — ANESTHESIA EVENT (OUTPATIENT)
Dept: OPERATING ROOM | Facility: CLINIC | Age: 34
End: 2024-10-30
Payer: MEDICAID

## 2024-11-01 ENCOUNTER — HOSPITAL ENCOUNTER (OUTPATIENT)
Facility: CLINIC | Age: 34
Setting detail: OUTPATIENT SURGERY
Discharge: HOME | End: 2024-11-01
Attending: ORTHOPAEDIC SURGERY | Admitting: ORTHOPAEDIC SURGERY
Payer: MEDICAID

## 2024-11-01 ENCOUNTER — HOSPITAL ENCOUNTER (OUTPATIENT)
Dept: RADIOLOGY | Facility: CLINIC | Age: 34
Discharge: HOME | End: 2024-11-01
Payer: MEDICAID

## 2024-11-01 ENCOUNTER — ANESTHESIA (OUTPATIENT)
Dept: OPERATING ROOM | Facility: CLINIC | Age: 34
End: 2024-11-01
Payer: MEDICAID

## 2024-11-01 VITALS
DIASTOLIC BLOOD PRESSURE: 96 MMHG | WEIGHT: 133 LBS | OXYGEN SATURATION: 97 % | RESPIRATION RATE: 18 BRPM | HEART RATE: 94 BPM | TEMPERATURE: 97.7 F | HEIGHT: 60 IN | BODY MASS INDEX: 26.11 KG/M2 | SYSTOLIC BLOOD PRESSURE: 143 MMHG

## 2024-11-01 DIAGNOSIS — M24.359 SPASTIC HIP DISLOCATION, UNSPECIFIED LATERALITY: ICD-10-CM

## 2024-11-01 DIAGNOSIS — G80.0 CP (CEREBRAL PALSY), SPASTIC, QUADRIPLEGIC (MULTI): Primary | ICD-10-CM

## 2024-11-01 PROCEDURE — 7100000001 HC RECOVERY ROOM TIME - INITIAL BASE CHARGE: Performed by: ORTHOPAEDIC SURGERY

## 2024-11-01 PROCEDURE — 3700000001 HC GENERAL ANESTHESIA TIME - INITIAL BASE CHARGE: Performed by: ORTHOPAEDIC SURGERY

## 2024-11-01 PROCEDURE — 3600000002 HC OR TIME - INITIAL BASE CHARGE - PROCEDURE LEVEL TWO: Performed by: ORTHOPAEDIC SURGERY

## 2024-11-01 PROCEDURE — 3700000002 HC GENERAL ANESTHESIA TIME - EACH INCREMENTAL 1 MINUTE: Performed by: ORTHOPAEDIC SURGERY

## 2024-11-01 PROCEDURE — 2500000004 HC RX 250 GENERAL PHARMACY W/ HCPCS (ALT 636 FOR OP/ED): Mod: SE | Performed by: ORTHOPAEDIC SURGERY

## 2024-11-01 PROCEDURE — 7100000009 HC PHASE TWO TIME - INITIAL BASE CHARGE: Performed by: ORTHOPAEDIC SURGERY

## 2024-11-01 PROCEDURE — 7100000002 HC RECOVERY ROOM TIME - EACH INCREMENTAL 1 MINUTE: Performed by: ORTHOPAEDIC SURGERY

## 2024-11-01 PROCEDURE — 20610 DRAIN/INJ JOINT/BURSA W/O US: CPT | Performed by: ORTHOPAEDIC SURGERY

## 2024-11-01 PROCEDURE — 3600000007 HC OR TIME - EACH INCREMENTAL 1 MINUTE - PROCEDURE LEVEL TWO: Performed by: ORTHOPAEDIC SURGERY

## 2024-11-01 PROCEDURE — 7100000010 HC PHASE TWO TIME - EACH INCREMENTAL 1 MINUTE: Performed by: ORTHOPAEDIC SURGERY

## 2024-11-01 RX ORDER — ACETAMINOPHEN 325 MG/1
650 TABLET ORAL EVERY 4 HOURS PRN
Status: DISCONTINUED | OUTPATIENT
Start: 2024-11-01 | End: 2024-11-01 | Stop reason: HOSPADM

## 2024-11-01 RX ORDER — LABETALOL HYDROCHLORIDE 5 MG/ML
5 INJECTION, SOLUTION INTRAVENOUS ONCE AS NEEDED
Status: DISCONTINUED | OUTPATIENT
Start: 2024-11-01 | End: 2024-11-01 | Stop reason: HOSPADM

## 2024-11-01 RX ORDER — HYDROCHLOROTHIAZIDE 12.5 MG/1
12.5 TABLET ORAL DAILY
Status: ON HOLD | COMMUNITY
End: 2024-11-01 | Stop reason: ENTERED-IN-ERROR

## 2024-11-01 RX ORDER — ALBUTEROL SULFATE 0.83 MG/ML
2.5 SOLUTION RESPIRATORY (INHALATION) ONCE AS NEEDED
Status: DISCONTINUED | OUTPATIENT
Start: 2024-11-01 | End: 2024-11-01 | Stop reason: HOSPADM

## 2024-11-01 RX ORDER — FENTANYL CITRATE 50 UG/ML
50 INJECTION, SOLUTION INTRAMUSCULAR; INTRAVENOUS EVERY 5 MIN PRN
Status: DISCONTINUED | OUTPATIENT
Start: 2024-11-01 | End: 2024-11-01 | Stop reason: HOSPADM

## 2024-11-01 RX ORDER — METOCLOPRAMIDE HYDROCHLORIDE 5 MG/ML
10 INJECTION INTRAMUSCULAR; INTRAVENOUS ONCE AS NEEDED
Status: DISCONTINUED | OUTPATIENT
Start: 2024-11-01 | End: 2024-11-01 | Stop reason: HOSPADM

## 2024-11-01 RX ORDER — TIZANIDINE HYDROCHLORIDE 4 MG/1
4 CAPSULE, GELATIN COATED ORAL 3 TIMES DAILY
Status: ON HOLD | COMMUNITY
End: 2024-11-01 | Stop reason: ENTERED-IN-ERROR

## 2024-11-01 RX ORDER — GLYCOPYRROLATE 1 MG/1
1 TABLET ORAL 2 TIMES DAILY
Status: ON HOLD | COMMUNITY
End: 2024-11-01 | Stop reason: ENTERED-IN-ERROR

## 2024-11-01 RX ORDER — LIDOCAINE HYDROCHLORIDE 10 MG/ML
0.1 INJECTION, SOLUTION EPIDURAL; INFILTRATION; INTRACAUDAL; PERINEURAL ONCE
Status: DISCONTINUED | OUTPATIENT
Start: 2024-11-01 | End: 2024-11-01 | Stop reason: HOSPADM

## 2024-11-01 RX ORDER — ONDANSETRON HYDROCHLORIDE 2 MG/ML
4 INJECTION, SOLUTION INTRAVENOUS ONCE AS NEEDED
Status: DISCONTINUED | OUTPATIENT
Start: 2024-11-01 | End: 2024-11-01 | Stop reason: HOSPADM

## 2024-11-01 RX ORDER — NITROFURANTOIN 25 MG/5ML
SUSPENSION ORAL 3 TIMES WEEKLY
Status: ON HOLD | COMMUNITY
End: 2024-11-01 | Stop reason: ENTERED-IN-ERROR

## 2024-11-01 RX ORDER — METHYLPREDNISOLONE ACETATE 40 MG/ML
INJECTION, SUSPENSION INTRA-ARTICULAR; INTRALESIONAL; INTRAMUSCULAR; SOFT TISSUE AS NEEDED
Status: DISCONTINUED | OUTPATIENT
Start: 2024-11-01 | End: 2024-11-01 | Stop reason: HOSPADM

## 2024-11-01 RX ORDER — FENTANYL CITRATE 50 UG/ML
25 INJECTION, SOLUTION INTRAMUSCULAR; INTRAVENOUS EVERY 5 MIN PRN
Status: DISCONTINUED | OUTPATIENT
Start: 2024-11-01 | End: 2024-11-01 | Stop reason: HOSPADM

## 2024-11-01 ASSESSMENT — COLUMBIA-SUICIDE SEVERITY RATING SCALE - C-SSRS
2. HAVE YOU ACTUALLY HAD ANY THOUGHTS OF KILLING YOURSELF?: NO
6. HAVE YOU EVER DONE ANYTHING, STARTED TO DO ANYTHING, OR PREPARED TO DO ANYTHING TO END YOUR LIFE?: NO
1. IN THE PAST MONTH, HAVE YOU WISHED YOU WERE DEAD OR WISHED YOU COULD GO TO SLEEP AND NOT WAKE UP?: NO

## 2024-11-01 ASSESSMENT — PAIN - FUNCTIONAL ASSESSMENT
PAIN_FUNCTIONAL_ASSESSMENT: FLACC (FACE, LEGS, ACTIVITY, CRY, CONSOLABILITY)
PAIN_FUNCTIONAL_ASSESSMENT: 0-10

## 2024-11-01 ASSESSMENT — PAIN SCALES - GENERAL: PAINLEVEL_OUTOF10: 0 - NO PAIN

## 2024-12-11 ENCOUNTER — HOSPITAL ENCOUNTER (OUTPATIENT)
Dept: RADIOLOGY | Facility: HOSPITAL | Age: 34
Discharge: HOME | End: 2024-12-11
Payer: MEDICAID

## 2024-12-11 DIAGNOSIS — N20.0 KIDNEY STONES: ICD-10-CM

## 2024-12-11 PROCEDURE — 74018 RADEX ABDOMEN 1 VIEW: CPT

## 2024-12-11 PROCEDURE — 74018 RADEX ABDOMEN 1 VIEW: CPT | Performed by: RADIOLOGY

## 2024-12-19 ENCOUNTER — APPOINTMENT (OUTPATIENT)
Dept: UROLOGY | Facility: CLINIC | Age: 34
End: 2024-12-19
Payer: MEDICAID

## 2024-12-19 DIAGNOSIS — N20.0 KIDNEY STONES: Primary | ICD-10-CM

## 2024-12-19 PROCEDURE — 1036F TOBACCO NON-USER: CPT | Performed by: STUDENT IN AN ORGANIZED HEALTH CARE EDUCATION/TRAINING PROGRAM

## 2024-12-19 PROCEDURE — 99214 OFFICE O/P EST MOD 30 MIN: CPT | Performed by: STUDENT IN AN ORGANIZED HEALTH CARE EDUCATION/TRAINING PROGRAM

## 2024-12-19 NOTE — PROGRESS NOTES
Scribed for Dr. Paolo Delgadillo by Michael Lucas. I, Dr. Paolo Delgadillo have personally reviewed and agreed with the information entered by the Virtual Scribe. 12/19/24.    ASSESSMENT:  Problem List Items Addressed This Visit       Kidney stones - Primary    Relevant Orders    US renal complete        PLAN:  #Nephrolithiasis  CT and recent KUB results reviewed with Caregiver.   Current left renal stone may be too large to pass. (LLP; 8 mm)   Suspect he will require surgical intervention eventually for removal.   Presently it is not symptomatic and not enlarged in last 6 months  Discussed options, elects to continue stone surveillance for now.  Advised to call if he becomes acutely symptomatic.   Discussed stone prevention; referred for a nutrition consult.   RTC in 4-6 months with a renal ultrasound prior to.   May consider referral to my colleague for elective surgery at that time. We will have a new endourologist working at that time who would better manage this stone.     Discussion:  We discussed that most calculi under 5 mm can be safely observed. This recommendation is based on large series looking at spontaneous passage rates that suggest that the likelihood of stone passage is highest for stones under 4 mm size (approximately 70-80%), moderate for stones between 4 and 6 mm (50%), and lowest for stones 6 mm or greater (20-30%).    I discussed with the patient the management options for kidney and ureteral stones, including observation, medical expulsive therapy, stent placement, rigid or flexible ureteroscopy with stone basket or laser stone fragmentation, ESWL; as well as advanced options for larger stones such as percutaneous and surgical approaches.    Patient was educated on stone prevention dietary modifications which include increased water intake, citric acid supplementation in the form of lemon juice, low oxalate diet, moderate protein intake and low sodium intake. In addition, suggested avoiding  dark-colored sodas. A daily urine output of 2.5 L is also recommended if feasible.     #Constipation  Patient appears to be significantly constipated and impacted.  Advised that this needs to be addressed prior to being considered for surgery.   Encouraged to follow up with PCP for this.     All questions were answered to the patient’s satisfaction.  Patient agrees with the plan and wishes to proceed.  Continue follow-up for ongoing care of his chronic medical conditions.       History of Present Illness (HPI):  Cory presents for a follow up visit.  The patient’s EMR has been reviewed.  Lives in [place], [place].  Has lived in a Group Home for the last 8-9 years.  Accompanied by the Manager of the group home whom is the primary historian.     Hx of cerebral palsy and nephrolithiasis, urinary incontinence and epilepsy.   Daily medications include phenobarbital daily.   Wears adult depends for his incontinence.   Previously seen by Dr. Hunt and Marii Eric.  Referred to me 2/2 concerns of forming bladder stones.     Underwent multiple scans, see HPI below.   No bladder stones, but with a LLP renal stone. (~8 mm).  Continues stone surveillance.      TODAY: (12/19/24)  Presents for follow up and KUB results.   Reports he has been doing well overall.   Denies any acute or worsening complaints.   Has not passed any stones since last visit.   Denies any recent infections, gross hematuria, flank pain, or fevers.     KUB (12/11/24) personally reviewed.   Left sided intrarenal stone visualized.     TO REVIEW: Last visit (06/20/24)  Last visit, elected to continue stone surveillance.   However, no recent interval imaging performed. (No recent KUB)  Denies any acute or worsening complaints.   Has not passed any stones since last visit.   Denies any recent infections, gross hematuria, flank pain, or fevers.     TO REVIEW: (12/05/23)  Per Caregiver/.  Patient has been doing well overall.  No acute or worsening  complaints.   He does not appear to be in any discomfort.   Latest CT results reviewed with Caregiver today.      CT A&P (11/02/23) personally reviewed.  Multiple bilateral non-obstructing renal calculi;  measuring up to 8 mm in the LLP.   No hydronephrosis.  Rectal dilation and extensive colonic stool.    KUB (12/14/23) personally reviewed.   Redemonstration of findings of known nephrolithiasis  Located LLP, up to 8 mm.   Additional smaller nephroliths noted on the previous CT are not well appreciated due to radiographic modality.    TO REVIEW: Initial visit (10/02/23)  Per Caregiver/:  Reports the patient passed a small amount of gross hematuria.   Occurred about 10 days ago.   Spontaneously resolved.   Has not recurred since.   Patient did not appear in any pain or discomfort.   Denies any recent UTIs or fevers.     Last CT imaging ((May 2022) was personally reviewed showed:  - bladder wall thickening, but no stones visualized.      Latest RBUS (05/11/23) showed:  - possible non-obstructing stones and bladder stones.     Last Cr 0.67 (July 11, 2023)    Past Medical History:   Diagnosis Date    Cerebral palsy     from [facility] Transfer Sheet    Dependent for mobility     Gastrostomy in place (Multi)     GERD (gastroesophageal reflux disease)     Intellectual disability     Intellectual disability     Nonverbal     Other conditions influencing health status     Pneumonia    Seizures (Multi)     Spastic quadriplegia (Multi)     From [facility] Transfer Sheet     Past Surgical History:   Procedure Laterality Date    OTHER SURGICAL HISTORY      gastrostomy    OTHER SURGICAL HISTORY      Steroid injections under anesthesia     Family History   Problem Relation Name Age of Onset    No Known Problems Mother       Social History     Tobacco Use   Smoking Status Never   Smokeless Tobacco Never     Current Outpatient Medications   Medication Sig Dispense Refill    acetaminophen (Tylenol) 325 mg  tablet 2 tablets (650 mg) by g-tube route every 4 hours if needed for mild pain (1 - 3).      albuterol 90 mcg/actuation inhaler Inhale 2 puffs 4 times a day as needed.      baclofen (Lioresal) 20 mg tablet Take 1 tablet (20 mg) by mouth 4 times a day.      bisacodyl (Dulcolax) 10 mg suppository Insert 1 suppository (10 mg) into the rectum every 3rd day if needed for constipation.      calcium carbonate-vitamin D3 500 mg-5 mcg (200 unit) tablet 1 tablet by g-tube route once daily.      diazePAM (Diastat AcuDiaL) 5-7.5-10 mg rectal kit Insert 10 mg into the rectum every 6 hours if needed for seizures. 5-7.5-10mg rectal kit      esomeprazole (NexIUM) 20 mg DR capsule Take 1 capsule (20 mg) by mouth once daily in the morning. Take before meals. Do not open capsule.      ibuprofen 100 mg/5 mL suspension 20 mL (400 mg) by g-tube route every 6 hours if needed for fever (temp greater than 38.0 C).      multivitamin/iron/folic acid (CENTRUM ORAL) 15 mL by peg tube route early in the morning.. LIQUID      PHENobarbitaL (Luminal) 100 mg tablet Take 1 tablet (100 mg) by mouth 2 times a day. 60 tablet 5    PHENobarbitaL (Luminal) 15 mg tablet Take 1 tablet (15 mg) by mouth 2 times a day. Take one tablet twice daily along with 100mg BID 60 tablet 5    polyethylene glycol 3350 (MIRALAX ORAL) 17 g by peg tube route twice a day. 17g  in 150ml water via g-tube      simethicone (Mylicon) 125 mg chewable tablet 1 tablet (125 mg) by g-tube route every 6 hours if needed for flatulence.       No current facility-administered medications for this visit.     No Known Allergies  Past medical, surgical, family and social history in the chart was reviewed and is accurate including any additions to what is in this HPI.    REVIEW OF SYSTEMS (ROS):   Constitutional: denies any unintentional weight loss or change in strength.  Integumentary: denies any rashes or pruritus.  Eyes: denies any double vision or eye pain.  Ear/Nose/Mouth/Throat: denies  any nosebleeds or gum bleeds.  Cardiovascular: denies any chest pain or syncope.  Respiratory: denies hemoptysis.  Gastrointestinal: denies nausea or vomiting.  Musculoskeletal: denies muscle cramping or weakness.  Neurologic: denies convulsions or seizures.  Hematologic/Lymphatic: denies bleeding tendencies.  Endocrine: denies heat/cold intolerance.  All other systems have been reviewed and are negative unless otherwise noted in the HPI.     OBJECTIVE:  There were no vitals taken for this visit.    PHYSICAL EXAM:  Constitutional: No obvious distress.  Eyes: Non-injected conjunctiva, sclera clear, EOMI.  Ears/Nose/Mouth/Throat: No obvious drainage per ears or nose.  Cardiovascular: Extremities are warm and well perfused. No edema, cyanosis or pallor.  Respiratory: No audible wheezing/stridor; respirations do not appear labored.  Gastrointestinal: Abdomen soft, not distended.  Musculoskeletal: Normal ROM of extremities.  Skin: No obvious rashes or open sores.  Neurologic: Alert and oriented, CN 2-12 grossly intact.  Psychiatric: Answers questions appropriately with normal affect.  Hematologic/Lymphatic/Immunologic: No obvious bruises or sites of spontaneous bleeding.  Genitourinary: No CVA tenderness, bladder not palpable.     LABS & IMAGING:  Lab Results   Component Value Date    WBC 6.2 07/16/2024    HGB 15.4 07/16/2024    HCT 46.7 07/16/2024     07/16/2024    CHOL 187 07/20/2021    TRIG 40 07/20/2021    HDL 65.5 07/20/2021    ALT 24 07/16/2024    AST 16 07/16/2024     07/16/2024    K 4.3 07/16/2024    CL 99 07/16/2024    CREATININE 0.62 07/16/2024    BUN 12 07/16/2024    CO2 34 (H) 07/16/2024    INR 1.2 (H) 12/29/2022    HGBA1C 5.5 02/06/2024     Scribed for Dr. Paolo Delgadillo by Michael Lucas.  I, Dr. Paolo Delgadillo have personally reviewed and agreed with the information entered by the Virtual Scribe. 12/19/24.

## 2025-01-14 ENCOUNTER — LAB REQUISITION (OUTPATIENT)
Dept: LAB | Facility: HOSPITAL | Age: 35
End: 2025-01-14
Payer: MEDICAID

## 2025-01-14 DIAGNOSIS — Z51.81 ENCOUNTER FOR THERAPEUTIC DRUG LEVEL MONITORING: ICD-10-CM

## 2025-01-14 DIAGNOSIS — E55.9 VITAMIN D DEFICIENCY, UNSPECIFIED: ICD-10-CM

## 2025-01-14 DIAGNOSIS — Z79.899 OTHER LONG TERM (CURRENT) DRUG THERAPY: ICD-10-CM

## 2025-01-14 LAB
ALBUMIN SERPL BCP-MCNC: 4 G/DL (ref 3.4–5)
ALP SERPL-CCNC: 126 U/L (ref 33–120)
ALT SERPL W P-5'-P-CCNC: 15 U/L (ref 10–52)
AST SERPL W P-5'-P-CCNC: 12 U/L (ref 9–39)
BILIRUB DIRECT SERPL-MCNC: 0 MG/DL (ref 0–0.3)
BILIRUB SERPL-MCNC: 0.2 MG/DL (ref 0–1.2)
PHENOBARB SERPL-MCNC: 40.1 UG/ML (ref 10–40)
PROT SERPL-MCNC: 7 G/DL (ref 6.4–8.2)

## 2025-01-14 PROCEDURE — 80184 ASSAY OF PHENOBARBITAL: CPT | Mod: OUT | Performed by: HOSPITALIST

## 2025-01-14 PROCEDURE — 84460 ALANINE AMINO (ALT) (SGPT): CPT | Mod: OUT | Performed by: HOSPITALIST

## 2025-01-14 PROCEDURE — 36415 COLL VENOUS BLD VENIPUNCTURE: CPT | Mod: OUT | Performed by: HOSPITALIST

## 2025-04-07 ENCOUNTER — PREP FOR PROCEDURE (OUTPATIENT)
Dept: POST ACUTE CARE | Facility: EXTERNAL LOCATION | Age: 35
End: 2025-04-07

## 2025-04-07 ENCOUNTER — APPOINTMENT (OUTPATIENT)
Dept: POST ACUTE CARE | Facility: EXTERNAL LOCATION | Age: 35
End: 2025-04-07
Payer: MEDICAID

## 2025-04-07 DIAGNOSIS — G80.0 CP (CEREBRAL PALSY), SPASTIC, QUADRIPLEGIC (MULTI): Primary | ICD-10-CM

## 2025-04-07 DIAGNOSIS — M24.359 SPASTIC HIP DISLOCATION, UNSPECIFIED LATERALITY: ICD-10-CM

## 2025-04-07 PROCEDURE — 99213 OFFICE O/P EST LOW 20 MIN: CPT | Performed by: ORTHOPAEDIC SURGERY

## 2025-04-07 NOTE — LETTER
April 7, 2025     h    Patient: Cory Cobian   YOB: 1990   Date of Visit: 4/7/2025       Dear Dr. gage:    Thank you for referring Cory Cobian to me for evaluation. Below are my notes for this consultation.  If you have questions, please do not hesitate to call me. I look forward to following your patient along with you.       Sincerely,     Samanta Tejada MD      CC: No Recipients  ______________________________________________________________________________________    Previous:  Cory is a 33-year-old male with a year history of cerebral palsy spastic quadriplegia who presents today as follow-up from his most recent left hip steroid injection done on February 9, 2022. He tolerated this very well and is still seeing pain relief from it. The staff reports that he has been comfortable sitting in his chair for longer (up to 2.5 hours), and tolerates both back and side-lying for longer periods of time except a recent period of discomfort on his R side with right side lying.     Today: There has been some notable pain in both hips, especially with movement.  He is tolerating less time sitting upright in his chair.  Prone causes discomfort.  It has been about a year since his last hip injections.    Past Medical History:   Diagnosis Date   • Cerebral palsy     from [facility] Transfer Sheet   • Dependent for mobility    • Gastrostomy in place (Multi)    • GERD (gastroesophageal reflux disease)    • Intellectual disability    • Intellectual disability    • Nonverbal    • Other conditions influencing health status     Pneumonia   • Seizures (Multi)    • Spastic quadriplegia (Multi)     From [facility] Transfer Sheet     Past Surgical History:   Procedure Laterality Date   • OTHER SURGICAL HISTORY      gastrostomy   • OTHER SURGICAL HISTORY      Steroid injections under anesthesia       Current Outpatient Medications:   •  acetaminophen (Tylenol) 325 mg tablet, 2 tablets (650 mg) by g-tube  route every 4 hours if needed for mild pain (1 - 3)., Disp: , Rfl:   •  albuterol 90 mcg/actuation inhaler, Inhale 2 puffs 4 times a day as needed., Disp: , Rfl:   •  baclofen (Lioresal) 20 mg tablet, Take 1 tablet (20 mg) by mouth 4 times a day., Disp: , Rfl:   •  bisacodyl (Dulcolax) 10 mg suppository, Insert 1 suppository (10 mg) into the rectum every 3rd day if needed for constipation., Disp: , Rfl:   •  calcium carbonate-vitamin D3 500 mg-5 mcg (200 unit) tablet, 1 tablet by g-tube route once daily., Disp: , Rfl:   •  diazePAM (Diastat AcuDiaL) 5-7.5-10 mg rectal kit, Insert 10 mg into the rectum every 6 hours if needed for seizures. 5-7.5-10mg rectal kit, Disp: , Rfl:   •  esomeprazole (NexIUM) 20 mg DR capsule, Take 1 capsule (20 mg) by mouth once daily in the morning. Take before meals. Do not open capsule., Disp: , Rfl:   •  ibuprofen 100 mg/5 mL suspension, 20 mL (400 mg) by g-tube route every 6 hours if needed for fever (temp greater than 38.0 C)., Disp: , Rfl:   •  multivitamin/iron/folic acid (CENTRUM ORAL), 15 mL by peg tube route early in the morning.. LIQUID, Disp: , Rfl:   •  PHENobarbitaL (Luminal) 100 mg tablet, Take 1 tablet (100 mg) by mouth 2 times a day., Disp: 60 tablet, Rfl: 5  •  PHENobarbitaL (Luminal) 15 mg tablet, Take 1 tablet (15 mg) by mouth 2 times a day. Take one tablet twice daily along with 100mg BID, Disp: 60 tablet, Rfl: 5  •  polyethylene glycol 3350 (MIRALAX ORAL), 17 g by peg tube route twice a day. 17g  in 150ml water via g-tube, Disp: , Rfl:   •  simethicone (Mylicon) 125 mg chewable tablet, 1 tablet (125 mg) by g-tube route every 6 hours if needed for flatulence., Disp: , Rfl:   No Known Allergies  Family History   Problem Relation Name Age of Onset   • No Known Problems Mother       Social History     Socioeconomic History   • Marital status: Single     Spouse name: Not on file   • Number of children: Not on file   • Years of education: Not on file   • Highest education  level: Not on file   Occupational History   • Not on file   Tobacco Use   • Smoking status: Never   • Smokeless tobacco: Never   Substance and Sexual Activity   • Alcohol use: Never   • Drug use: Never   • Sexual activity: Not on file   Other Topics Concern   • Not on file   Social History Narrative   • Not on file     Social Drivers of Health     Financial Resource Strain: Low Risk  (2/20/2024)    Overall Financial Resource Strain (CARDIA)    • Difficulty of Paying Living Expenses: Not hard at all   Food Insecurity: Not on file   Transportation Needs: No Transportation Needs (2/20/2024)    PRAPARE - Transportation    • Lack of Transportation (Medical): No    • Lack of Transportation (Non-Medical): No   Physical Activity: Not on file   Stress: Not on file   Social Connections: Not on file   Intimate Partner Violence: Not on file   Housing Stability: Low Risk  (2/20/2024)    Housing Stability Vital Sign    • Unable to Pay for Housing in the Last Year: No    • Number of Places Lived in the Last Year: 1    • Unstable Housing in the Last Year: No       Review of Systems: 16 systems reviewed with the patient and family.  All systems were negative except for the pertinent positives noted in the history above.    General: Well developed, well nourished male in no acute distress.    Skin: The skin is intact with no evidence of abrasions, bruises, or swelling.    Vascular: There are 2+ pulses in both lower extremities and brisk capillary refill.    Neuro: The light touch sensation is intact in both legs.    The left hip can be moved into some minimal abduction - only about 10 total degrees (more on the right) and he seems to have no pain. He has almost no hip flexion on the left, but can be fully extended. He also allows adduction, IR and ER on both sides, with no obvious pain today. His knees and ankles are stiff with very limited mobility. His spine is straight, I could abduct his shoulders to neutral and he kept his  elbows pretty tightly held in some flexion. He Has increased left hip pain today compared to previous visits (above) and the left seems more painful than the right.  He had a lot of tightness when I tried to move his arms, tighter on the left than the right.  Overhead abduction was extremely limited.  I could flex and extend both elbows although not to full extension.  I can also pronate and supinate his forearm.  He does that spontaneously pretty well.  His spine is straight.     Assesment: Cory Cobian is a 34 y.o. male who has CPSQ and a history of hip arthritis.  His hips are bothering him again so we will plan to do bilateral hip steroid injections.  We will need to get consent from his guardian.  This will need to be done at [place] because of his age.    His group home nurse manager is Bri and she can be reached at [phone].    His dad, Negrito Mcgee, is his guardian and his phone number is [phone] with an email address of [email or web address].  He can be sometimes difficult to reach by phone, so our residents should note on Friday in conference that we we will start calling him in a week in advance.

## 2025-04-07 NOTE — PROGRESS NOTES
Previous:  Cory is a 33-year-old male with a year history of cerebral palsy spastic quadriplegia who presents today as follow-up from his most recent left hip steroid injection done on February 9, 2022. He tolerated this very well and is still seeing pain relief from it. The staff reports that he has been comfortable sitting in his chair for longer (up to 2.5 hours), and tolerates both back and side-lying for longer periods of time except a recent period of discomfort on his R side with right side lying.     Today: There has been some notable pain in both hips, especially with movement.  He is tolerating less time sitting upright in his chair.  Prone causes discomfort.  It has been about a year since his last hip injections.    Past Medical History:   Diagnosis Date    Cerebral palsy     from [facility] Transfer Sheet    Dependent for mobility     Gastrostomy in place (Multi)     GERD (gastroesophageal reflux disease)     Intellectual disability     Intellectual disability     Nonverbal     Other conditions influencing health status     Pneumonia    Seizures (Multi)     Spastic quadriplegia (Multi)     From [facility] Transfer Sheet     Past Surgical History:   Procedure Laterality Date    OTHER SURGICAL HISTORY      gastrostomy    OTHER SURGICAL HISTORY      Steroid injections under anesthesia       Current Outpatient Medications:     acetaminophen (Tylenol) 325 mg tablet, 2 tablets (650 mg) by g-tube route every 4 hours if needed for mild pain (1 - 3)., Disp: , Rfl:     albuterol 90 mcg/actuation inhaler, Inhale 2 puffs 4 times a day as needed., Disp: , Rfl:     baclofen (Lioresal) 20 mg tablet, Take 1 tablet (20 mg) by mouth 4 times a day., Disp: , Rfl:     bisacodyl (Dulcolax) 10 mg suppository, Insert 1 suppository (10 mg) into the rectum every 3rd day if needed for constipation., Disp: , Rfl:     calcium carbonate-vitamin D3 500 mg-5 mcg (200 unit) tablet, 1 tablet by g-tube route once daily., Disp: ,  Rfl:     diazePAM (Diastat AcuDiaL) 5-7.5-10 mg rectal kit, Insert 10 mg into the rectum every 6 hours if needed for seizures. 5-7.5-10mg rectal kit, Disp: , Rfl:     esomeprazole (NexIUM) 20 mg DR capsule, Take 1 capsule (20 mg) by mouth once daily in the morning. Take before meals. Do not open capsule., Disp: , Rfl:     ibuprofen 100 mg/5 mL suspension, 20 mL (400 mg) by g-tube route every 6 hours if needed for fever (temp greater than 38.0 C)., Disp: , Rfl:     multivitamin/iron/folic acid (CENTRUM ORAL), 15 mL by peg tube route early in the morning.. LIQUID, Disp: , Rfl:     PHENobarbitaL (Luminal) 100 mg tablet, Take 1 tablet (100 mg) by mouth 2 times a day., Disp: 60 tablet, Rfl: 5    PHENobarbitaL (Luminal) 15 mg tablet, Take 1 tablet (15 mg) by mouth 2 times a day. Take one tablet twice daily along with 100mg BID, Disp: 60 tablet, Rfl: 5    polyethylene glycol 3350 (MIRALAX ORAL), 17 g by peg tube route twice a day. 17g  in 150ml water via g-tube, Disp: , Rfl:     simethicone (Mylicon) 125 mg chewable tablet, 1 tablet (125 mg) by g-tube route every 6 hours if needed for flatulence., Disp: , Rfl:   No Known Allergies  Family History   Problem Relation Name Age of Onset    No Known Problems Mother       Social History     Socioeconomic History    Marital status: Single     Spouse name: Not on file    Number of children: Not on file    Years of education: Not on file    Highest education level: Not on file   Occupational History    Not on file   Tobacco Use    Smoking status: Never    Smokeless tobacco: Never   Substance and Sexual Activity    Alcohol use: Never    Drug use: Never    Sexual activity: Not on file   Other Topics Concern    Not on file   Social History Narrative    Not on file     Social Drivers of Health     Financial Resource Strain: Low Risk  (2/20/2024)    Overall Financial Resource Strain (CARDIA)     Difficulty of Paying Living Expenses: Not hard at all   Food Insecurity: Not on file    Transportation Needs: No Transportation Needs (2/20/2024)    PRAPARE - Transportation     Lack of Transportation (Medical): No     Lack of Transportation (Non-Medical): No   Physical Activity: Not on file   Stress: Not on file   Social Connections: Not on file   Intimate Partner Violence: Not on file   Housing Stability: Low Risk  (2/20/2024)    Housing Stability Vital Sign     Unable to Pay for Housing in the Last Year: No     Number of Places Lived in the Last Year: 1     Unstable Housing in the Last Year: No       Review of Systems: 16 systems reviewed with the patient and family.  All systems were negative except for the pertinent positives noted in the history above.    General: Well developed, well nourished male in no acute distress.    Skin: The skin is intact with no evidence of abrasions, bruises, or swelling.    Vascular: There are 2+ pulses in both lower extremities and brisk capillary refill.    Neuro: The light touch sensation is intact in both legs.    The left hip can be moved into some minimal abduction - only about 10 total degrees (more on the right) and he seems to have no pain. He has almost no hip flexion on the left, but can be fully extended. He also allows adduction, IR and ER on both sides, with no obvious pain today. His knees and ankles are stiff with very limited mobility. His spine is straight, I could abduct his shoulders to neutral and he kept his elbows pretty tightly held in some flexion. He Has increased left hip pain today compared to previous visits (above) and the left seems more painful than the right.  He had a lot of tightness when I tried to move his arms, tighter on the left than the right.  Overhead abduction was extremely limited.  I could flex and extend both elbows although not to full extension.  I can also pronate and supinate his forearm.  He does that spontaneously pretty well.  His spine is straight.     Assesment: Cory Cobian is a 34 y.o. male who has CPSQ  and a history of hip arthritis.  His hips are bothering him again so we will plan to do bilateral hip steroid injections.  We will need to get consent from his guardian.  This will need to be done at [place] because of his age.    His group home nurse manager is Bri and she can be reached at [phone].    His dad, Negrito Mcgee, is his guardian and his phone number is [phone] with an email address of [email or web address].  He can be sometimes difficult to reach by phone, so our residents should note on Friday in conference that we we will start calling him in a week in advance.

## 2025-04-24 ENCOUNTER — HOSPITAL ENCOUNTER (OUTPATIENT)
Dept: RADIOLOGY | Facility: HOSPITAL | Age: 35
Discharge: HOME | End: 2025-04-24
Payer: MEDICAID

## 2025-04-24 DIAGNOSIS — N20.0 KIDNEY STONES: ICD-10-CM

## 2025-04-24 PROCEDURE — 76770 US EXAM ABDO BACK WALL COMP: CPT

## 2025-04-24 PROCEDURE — 76770 US EXAM ABDO BACK WALL COMP: CPT | Performed by: RADIOLOGY

## 2025-05-05 ENCOUNTER — APPOINTMENT (OUTPATIENT)
Dept: UROLOGY | Facility: CLINIC | Age: 35
End: 2025-05-05
Payer: MEDICAID

## 2025-06-13 ASSESSMENT — ENCOUNTER SYMPTOMS
SEIZURES: 1
ROS GI COMMENTS: PRESENCE OF G-TUBE

## 2025-06-13 NOTE — CPM/PAT H&P
CPM/PAT Evaluation       Name: Cory Cobian   /Age: 1990       TELEMEDICINE ENCOUNTER  Patient was interviewed by telephone for preadmission testing perioperative risk assessment prior to surgery.    DATE OF CONSULT: 2025  REFERRING PROVIDER: Dr. Samanta Tejada  SURGERY, DATE, AND LENGTH: Steroid injections of bilateral hips; 2025; 60 minutes     CHIEF COMPLAINT  cerebral palsy spastic quadriplegia    HPI  Cory Cobian is a 33-year-old male who resides at [facility] in [facility].  His medical history is provided through documentation from this facility.  Patient has cerebral palsy spastic quadriplegia who receives steroid injections to his hips for pain relief, and for greater comfort with various sitting and sleeping positions.  In the past he has tolerated this procedure very well and has had good results.  Recently there has been some notable pain in his right hip particularly with movement.  He can no longer sit up in a chair for the same length of time as he used to without visible discomfort.  Prone positions are also causing discomfort.  He is scheduled for repeat steroid injection to the hips.  For this reason he has been referred to preadmission testing in anticipation of steroid injections.  Patient with medical history significant for seizure disorder; functional quadriplegia 2/2 cerebral palsy; recent diagnosis of kidney stones.  In the past month, he has  not had any respiratory illnesses including Covid 19, Influenza; Respiratory Syncytial Virus (RSV), or pneumonia.      ACTIVE PROBLEMS  Patient Active Problem List   Diagnosis    CP (cerebral palsy), spastic, quadriplegic (Multi)    Kidney stones    Spastic hip dislocation    Seizure disorder (Multi)    Pain, hip    Functional quadriplegia (Multi)    Influenza A        PAST MEDICAL HISTORY  Past Medical History:   Diagnosis Date    Cerebral palsy     from [facility] Transfer Sheet    Dependent for  mobility     Gastrostomy in place (Multi)     GERD (gastroesophageal reflux disease)     Intellectual disability     Intellectual disability     Nonverbal     Other conditions influencing health status     Pneumonia    Seizures (Multi)     Spastic quadriplegia (Multi)     From [facility] Transfer Sheet        SURGICAL HISTORY  Past Surgical History:   Procedure Laterality Date    OTHER SURGICAL HISTORY      gastrostomy    OTHER SURGICAL HISTORY      Steroid injections under anesthesia        ANESTHESIA HISTORY  Denies problems with anesthesia in the past such as PONV, prolonged sedation, awareness, dental damage, aspiration, cardiac arrest, difficult intubation, or unexpected hospital admissions. Denies family history of malignant hyperthermia, or pseudocholinesterase deficiency.     SOCIAL HISTORY  No smoking, drinking, or alcohol history.  Has intellectual disability, and is nonverbal.  He ambulates by wheelchair.  He resides in a group home.    FAMILY HISTORY  Family History   Problem Relation Name Age of Onset    No Known Problems Mother          ALLERGIES  No Known Allergies     MEDICATIONS  No current facility-administered medications for this encounter.    Current Outpatient Medications:     acetaminophen (Tylenol) 325 mg tablet, 2 tablets (650 mg) by g-tube route every 4 hours if needed for mild pain (1 - 3)., Disp: , Rfl:     albuterol 90 mcg/actuation inhaler, Inhale 2 puffs 4 times a day as needed., Disp: , Rfl:     baclofen (Lioresal) 20 mg tablet, Take 1 tablet (20 mg) by mouth 4 times a day., Disp: , Rfl:     bisacodyl (Dulcolax) 10 mg suppository, Insert 1 suppository (10 mg) into the rectum every 3rd day if needed for constipation., Disp: , Rfl:     calcium carbonate-vitamin D3 500 mg-5 mcg (200 unit) tablet, 1 tablet by g-tube route once daily., Disp: , Rfl:     diazePAM (Diastat AcuDiaL) 5-7.5-10 mg rectal kit, Insert 10 mg into the rectum every 6 hours if needed for seizures. 5-7.5-10mg rectal  kit, Disp: , Rfl:     esomeprazole (NexIUM) 20 mg DR capsule, Take 1 capsule (20 mg) by mouth once daily in the morning. Take before meals. Do not open capsule., Disp: , Rfl:     ibuprofen 100 mg/5 mL suspension, 20 mL (400 mg) by g-tube route every 6 hours if needed for fever (temp greater than 38.0 C)., Disp: , Rfl:     multivitamin/iron/folic acid (CENTRUM ORAL), 15 mL by peg tube route early in the morning.. LIQUID, Disp: , Rfl:     PHENobarbitaL (Luminal) 100 mg tablet, Take 1 tablet (100 mg) by mouth 2 times a day., Disp: 60 tablet, Rfl: 5    PHENobarbitaL (Luminal) 15 mg tablet, Take 1 tablet (15 mg) by mouth 2 times a day. Take one tablet twice daily along with 100mg BID, Disp: 60 tablet, Rfl: 5    polyethylene glycol 3350 (MIRALAX ORAL), 17 g by peg tube route twice a day. 17g  in 150ml water via g-tube, Disp: , Rfl:     simethicone (Mylicon) 125 mg chewable tablet, 1 tablet (125 mg) by g-tube route every 6 hours if needed for flatulence., Disp: , Rfl:      REVIEW OF SYSTEMS  Review of Systems   Gastrointestinal:         Presence of G-tube   Musculoskeletal:         Bilateral hip pain   Neurological:  Positive for seizures.        Spastic quadriplegia, cerebral palsy   All other systems reviewed and are negative.    STOP BANG: Negative for REJI      PHYSICAL EXAM  Deferred    AIRWAY EXAM  Deferred    VITALS  No vitals taken for telemedicine visit  BMI Readings from Last 1 Encounters:   11/01/24 29.82 kg/m²      BP Readings from Last 4 Encounters:   11/01/24 (!) 143/96   10/02/24 (!) 141/100   04/03/24 (!) 129/96   02/22/24 (!) 146/94        LABS  Lab Results   Component Value Date    WBC 6.2 07/16/2024    HGB 15.4 07/16/2024    HCT 46.7 07/16/2024    MCV 97 07/16/2024     07/16/2024      Lab Results   Component Value Date    GLUCOSE 82 07/16/2024    CALCIUM 9.1 07/16/2024     07/16/2024    K 4.3 07/16/2024    CO2 34 (H) 07/16/2024    CL 99 07/16/2024    BUN 12 07/16/2024    CREATININE 0.62  "07/16/2024      Lab Results   Component Value Date    HGBA1C 5.5 02/06/2024      Lab Results   Component Value Date    CHOL 187 07/20/2021     Lab Results   Component Value Date    HDL 65.5 07/20/2021     No results found for: \"LDLCALC\"  Lab Results   Component Value Date    TRIG 40 07/20/2021     ECG from 02/28/2024    Indications  Priority: STAT  Chest pain and /or palpatations   Comments: Present the Current EKG to the Physician along with most Recent EKG Performed     Interpretation Summary  Show Result ComparisonSinus tachycardia  Otherwise normal ECG  No previous ECGs available      ASSESSMENT/PLAN  Cerebral palsy, spastic quadriplegia  Steroid injections of bilateral hips      This note was created in part upon personal review of patient's medical records.  Speech recognition transcription software was used in the creation of this note. Despite proofreading, several typographical errors might be present that might affect the meaning of the content.       "

## 2025-06-13 NOTE — PREPROCEDURE INSTRUCTIONS
Pre-Operative Instructions &?Checklist      Your surgery has been scheduled at [facility] at [address] [place], [address], [address], in the [facility]. Parking is to the left of the main entrance.     You will be contacted about the time of your surgery the day before your surgery (if your surgery is on a Monday, you will be called the Friday before surgery). If you are unable to answer the phone, a detailed voicemail message will be left. Make sure that your voicemail box is not full so a message can be left. If you have not received a call by 3:00 pm you may call [phone] between the hours of 3:00 and 4:00 pm. Please be available by phone the night before/day of surgery in case there is a change in the schedule which may require you to arrive earlier/later.     ?     ?7 DAYS BEFORE SURGERY STOP THESE MEDICATIONS:       *Multiple Vitamins containing Vitamin E       * Herbal supplements, Fish Oil, garlic pills, turmeric, CoQ enzyme       *Stop taking aspirin, aspirin-containing products, and NSAID's like Advil, Motrin, Aleve, Ibuprofen, Meloxicam, Celecoxib, and Diclofenac.. Tylenol is okay to take for pain relief.        *If you are currently taking Coumadin/Warfarin, we will have to coordinate that with your PCP &/or the Anticoagulation Clinic.     THE EVENING BEFORE SURGERY:   Do not eat any food after midnight the night before surgery.    You are permitted to have no more than 4 ounces of clear liquid up to 3 hours before your arrival time.   Clear liquids are liquids you see through such as: water, pulp-free juices like apple or white grape juice, coffee and tea without creamer or milk (sugar is okay); clear soda and sports drinks.     DAY OF SURGERY TAKE THESE MEDICATIONS (if it is not listed, do not take it.)    Take: Phenobarbital; Nexium; baclofen  If taking medications in tablet/capsule form take with a small sip of water.     ON THE MORNING OF SURGERY:        *Shower either the night before your surgery or the morning of your surgery       *Do not use moisturizers, creams, lotions or perfume, or make-up.       *Wear comfortable, loose fitting clothing.        *All jewelry and valuables should be left at home.       *Prosthetic devices such as contact lenses, hearing aids, dentures, eyelash extensions, hairpins and body piercings must be removed before surgery. Bring         containers for eyeglasses/contacts, dentures, or hearing aids with you.     ? Diabetics: Please check fasting blood sugars upon waking up. ?If fasting blood sugars are <80ml/dl, please drink 100ml/3oz. of apple juice no later than 2 hours prior                    to surgery.     ?BRING WITH YOU:        *Photo ID and insurance card       *Current list of medicines and allergies       *Pacemaker/Defibrillator/Heart stent cards       *Copy of your complete Advanced Directive/DHPOA, or proof of guardianship-if applicable     ?SMOKING:       *Quitting smoking can make a huge difference to your health and recovery from surgery. ?       *If you need help with quitting, call 2-800-QUIT-NOW.       ALCOHOL:       *No alcoholic beverages for 48 hours before surgery.     ?AFTER OUTPATIENT SURGERY:       *A responsible adult MUST accompany you at the time of discharge and stay with you for 24 hours after your surgery.       *You may NOT drive yourself home after surgery.       *You may use a taxi or ride sharing service ([employer], Uber) to return home ONLY if you are accompanied by a friend or family member       *Instructions for resuming your medications will be provided by your surgeon.     CONTACT SURGEON'S OFFICE IF YOU DEVELOP:       *Fever =/>?100.4 F        *New respiratory symptoms (e.g. cough, shortness of breath, respiratory distress, sore throat)       *Recent loss of taste or smell       *Flu like symptoms such as headache, fatigue or gastrointestinal symptoms       *If you develop any open sores,  shingles, burning or painful urination    AND/OR:       *You no longer wish to have the surgery.       *Any other personal circumstances change that may lead to the need to cancel or defer this surgery.       *You were admitted to any hospital within one week of your planned procedure.     ?If you have any questions regarding these preoperative instructions, you may call [phone]. If you have questions regarding you surgical procedure, or post-operative care/recovery please call your surgeon's office.     Link to [facility] Laboratory Services Locations   [email or web address]     Link to [facility]   [email or web address]

## 2025-06-16 ENCOUNTER — APPOINTMENT (OUTPATIENT)
Dept: UROLOGY | Facility: CLINIC | Age: 35
End: 2025-06-16
Payer: MEDICAID

## 2025-06-16 DIAGNOSIS — N20.0 NEPHROLITHIASIS: Primary | ICD-10-CM

## 2025-06-16 PROCEDURE — 99212 OFFICE O/P EST SF 10 MIN: CPT | Performed by: STUDENT IN AN ORGANIZED HEALTH CARE EDUCATION/TRAINING PROGRAM

## 2025-06-16 NOTE — PROGRESS NOTES
Scribed for Dr. Paolo Delgadillo by Michael Lucas. I, Dr. Paolo Delgadillo have personally reviewed and agreed with the information entered by the Virtual Scribe. 06/16/25.    ASSESSMENT:  Problem List Items Addressed This Visit    None  Visit Diagnoses         Nephrolithiasis    -  Primary               PLAN:  #Nephrolithiasis  He has small bilateral non-obstructing intrarenal calculi.   Noted a left renal stone (LLP) which may be too large to pass. (LLP; 8 mm)   Suspect he may eventually require surgical intervention if he becomes symptomatic.  Presently it is not symptomatic and not enlarged in last 6 months  Discussed options, Caregiver elects to continue stone surveillance for now.  Advised to call if he becomes acutely symptomatic.   Discussed stone prevention; referred for a nutrition consult.   RTC in 6 months with a KUB prior to.     May follow up with Dr. Noguera to review results and continued care.   Referral provided.     Discussion:  We discussed that most calculi under 5 mm can be safely observed. This recommendation is based on large series looking at spontaneous passage rates that suggest that the likelihood of stone passage is highest for stones under 4 mm size (approximately 70-80%), moderate for stones between 4 and 6 mm (50%), and lowest for stones 6 mm or greater (20-30%).    I discussed with the patient the management options for kidney and ureteral stones, including observation, medical expulsive therapy, stent placement, rigid or flexible ureteroscopy with stone basket or laser stone fragmentation, ESWL; as well as advanced options for larger stones such as percutaneous and surgical approaches.    Patient was educated on stone prevention dietary modifications which include increased water intake, citric acid supplementation in the form of lemon juice, low oxalate diet, moderate protein intake and low sodium intake. In addition, suggested avoiding dark-colored sodas. A daily urine output of 2.5 L  is also recommended if feasible.     #Constipation  Patient appears to be significantly constipated and impacted.  Advised that this needs to be addressed prior to being considered for surgery.   Encouraged to follow up with PCP for this.     All questions were answered to the patient’s satisfaction.  Patient agrees with the plan and wishes to proceed.  Continue follow-up for ongoing care of his chronic medical conditions.       History of Present Illness (HPI):  Cory presents for a follow up visit.  The patient’s EMR has been reviewed.  Lives in [place], [place].  Has lived in a Group Home for the last 8-9 years.  Accompanied by the Manager of the group home whom is the primary historian.     Hx of cerebral palsy, nephrolithiasis, urinary incontinence and epilepsy.   Daily medications include phenobarbital daily.   Wears adult depends for his incontinence.   Previously seen by Dr. Hunt and Marii Eric.  Referred to me 2/2 concerns of forming bladder stones.     Underwent multiple scans, see HPI below.   No bladder stones, but with a LLP renal stone. (~8 mm).  Continues stone surveillance.      TODAY: (06/16/25)  Presents for follow up and ultrasound results.   Doing well, no pain, no hematuria or stone related symptoms.    RBUS (04/24/25) personally reviewed and independently interpreted.  Bilateral non-obstructing renal calculi.   No hydronephrosis bilaterally.   Has a large left lower pole stone (~8 mm).   Similar to prior KUB (12/11/24)    TO REVIEW: [12/19/24]  Presents for follow up and KUB results.   Reports he has been doing well overall.   Denies any acute or worsening complaints.   Has not passed any stones since last visit.   Denies any recent infections, gross hematuria, flank pain, or fevers.     KUB (12/11/24) personally reviewed.   Left sided intrarenal stone visualized.     TO REVIEW: (06/20/24)  Last visit, elected to continue stone surveillance.   However, no recent interval imaging performed. (No  recent KUB)  Denies any acute or worsening complaints.   Has not passed any stones since last visit.   Denies any recent infections, gross hematuria, flank pain, or fevers.     TO REVIEW: (12/05/23)  Per Caregiver/.  Patient has been doing well overall.  No acute or worsening complaints.   He does not appear to be in any discomfort.   Latest CT results reviewed with Caregiver today.      CT A&P (11/02/23) personally reviewed.  Multiple bilateral non-obstructing renal calculi;  measuring up to 8 mm in the LLP.   No hydronephrosis.  Rectal dilation and extensive colonic stool.    KUB (12/14/23) personally reviewed.   Redemonstration of findings of known nephrolithiasis  Located LLP, up to 8 mm.   Additional smaller nephroliths noted on the previous CT are not well appreciated due to radiographic modality.    TO REVIEW: Initial visit (10/02/23)  Per Caregiver/:  Reports the patient passed a small amount of gross hematuria.   Occurred about 10 days ago.   Spontaneously resolved.   Has not recurred since.   Patient did not appear in any pain or discomfort.   Denies any recent UTIs or fevers.     Last CT imaging ((May 2022) was personally reviewed showed:  - bladder wall thickening, but no stones visualized.      Latest RBUS (05/11/23) showed:  - possible non-obstructing stones and bladder stones.     Last Cr 0.67 (July 11, 2023)    Past Medical History:   Diagnosis Date    Cerebral palsy     from [facility] Transfer Sheet    Dependent for mobility     Gastrostomy in place (Multi)     GERD (gastroesophageal reflux disease)     Intellectual disability     Intellectual disability     Nonverbal     Other conditions influencing health status     Pneumonia    Seizures (Multi)     Spastic quadriplegia (Multi)     From [facility] Transfer Sheet     Past Surgical History:   Procedure Laterality Date    OTHER SURGICAL HISTORY      gastrostomy    OTHER SURGICAL HISTORY      Steroid  injections under anesthesia     Family History   Problem Relation Name Age of Onset    No Known Problems Mother       Social History     Tobacco Use   Smoking Status Never   Smokeless Tobacco Never     Current Outpatient Medications   Medication Sig Dispense Refill    acetaminophen (Tylenol) 325 mg tablet Take 2 tablets (650 mg) by g-tube every 4 hours if needed for mild pain (1 - 3).      albuterol 90 mcg/actuation inhaler Inhale 2 puffs 4 times a day as needed.      baclofen (Lioresal) 20 mg tablet Take 1 tablet (20 mg) by mouth 4 times a day.      bisacodyl (Dulcolax) 10 mg suppository Insert 1 suppository (10 mg) into the rectum every 3rd day if needed for constipation.      calcium carbonate-vitamin D3 500 mg-5 mcg (200 unit) tablet Take 1 tablet by g-tube once daily.      diazePAM (Diastat AcuDiaL) 5-7.5-10 mg rectal kit Insert 10 mg into the rectum every 6 hours if needed for seizures. 5-7.5-10mg rectal kit      esomeprazole (NexIUM) 20 mg DR capsule Take 1 capsule (20 mg) by mouth once daily in the morning. Take before meals. Do not open capsule.      ibuprofen 100 mg/5 mL suspension Take 20 mL (400 mg) by g-tube every 6 hours if needed for fever (temp greater than 38.0 C).      multivitamin/iron/folic acid (CENTRUM ORAL) 15 mL by peg tube route early in the morning.. LIQUID      polyethylene glycol 3350 (MIRALAX ORAL) 17 g by peg tube route twice a day. 17g  in 150ml water via g-tube      simethicone (Mylicon) 125 mg chewable tablet Take 1 tablet (125 mg) by g-tube every 6 hours if needed for flatulence.      PHENobarbitaL (Luminal) 100 mg tablet Take 1 tablet (100 mg) by mouth 2 times a day. 60 tablet 5    PHENobarbitaL (Luminal) 15 mg tablet Take 1 tablet (15 mg) by mouth 2 times a day. Take one tablet twice daily along with 100mg BID 60 tablet 5     No current facility-administered medications for this visit.     No Known Allergies  Past medical, surgical, family and social history in the chart was  reviewed and is accurate including any additions to what is in this HPI.    REVIEW OF SYSTEMS (ROS):   Constitutional: denies any unintentional weight loss or change in strength.  Integumentary: denies any rashes or pruritus.  Eyes: denies any double vision or eye pain.  Ear/Nose/Mouth/Throat: denies any nosebleeds or gum bleeds.  Cardiovascular: denies any chest pain or syncope.  Respiratory: denies hemoptysis.  Gastrointestinal: denies nausea or vomiting.  Musculoskeletal: denies muscle cramping or weakness.  Neurologic: denies convulsions or seizures.  Hematologic/Lymphatic: denies bleeding tendencies.  Endocrine: denies heat/cold intolerance.  All other systems have been reviewed and are negative unless otherwise noted in the HPI.     OBJECTIVE:  There were no vitals taken for this visit.    PHYSICAL EXAM:  Constitutional: No obvious distress.  Eyes: Non-injected conjunctiva, sclera clear, EOMI.  Ears/Nose/Mouth/Throat: No obvious drainage per ears or nose.  Cardiovascular: Extremities are warm and well perfused. No edema, cyanosis or pallor.  Respiratory: No audible wheezing/stridor; respirations do not appear labored.  Gastrointestinal: Abdomen soft, not distended.  Musculoskeletal: Normal ROM of extremities.  Skin: No obvious rashes or open sores.  Neurologic: Alert and oriented, CN 2-12 grossly intact.  Psychiatric: Answers questions appropriately with normal affect.  Hematologic/Lymphatic/Immunologic: No obvious bruises or sites of spontaneous bleeding.  Genitourinary: No CVA tenderness, bladder not palpable.     LABS & IMAGING:  Lab Results   Component Value Date    WBC 6.2 07/16/2024    HGB 15.4 07/16/2024    HCT 46.7 07/16/2024     07/16/2024    CHOL 187 07/20/2021    TRIG 40 07/20/2021    HDL 65.5 07/20/2021    ALT 15 01/14/2025    AST 12 01/14/2025     07/16/2024    K 4.3 07/16/2024    CL 99 07/16/2024    CREATININE 0.62 07/16/2024    BUN 12 07/16/2024    CO2 34 (H) 07/16/2024    INR 1.2  (H) 12/29/2022    HGBA1C 5.5 02/06/2024     Scribed for Dr. Paolo Delgadillo by Michael Lucas.  I, Dr. Paolo Delgadillo have personally reviewed and agreed with the information entered by the Virtual Scribe. 06/16/25.

## 2025-06-23 ENCOUNTER — ANESTHESIA EVENT (OUTPATIENT)
Dept: OPERATING ROOM | Facility: CLINIC | Age: 35
End: 2025-06-23

## 2025-06-27 ENCOUNTER — HOSPITAL ENCOUNTER (EMERGENCY)
Facility: HOSPITAL | Age: 35
Discharge: HOME | End: 2025-06-27
Attending: EMERGENCY MEDICINE
Payer: MEDICAID

## 2025-06-27 ENCOUNTER — APPOINTMENT (OUTPATIENT)
Dept: CARDIOLOGY | Facility: HOSPITAL | Age: 35
End: 2025-06-27
Payer: MEDICAID

## 2025-06-27 ENCOUNTER — HOSPITAL ENCOUNTER (OUTPATIENT)
Facility: CLINIC | Age: 35
Setting detail: OUTPATIENT SURGERY
Discharge: HOME | End: 2025-06-27
Attending: ORTHOPAEDIC SURGERY | Admitting: ORTHOPAEDIC SURGERY
Payer: MEDICAID

## 2025-06-27 ENCOUNTER — ANESTHESIA (OUTPATIENT)
Dept: OPERATING ROOM | Facility: CLINIC | Age: 35
End: 2025-06-27
Payer: MEDICAID

## 2025-06-27 VITALS
HEART RATE: 82 BPM | RESPIRATION RATE: 20 BRPM | WEIGHT: 113.32 LBS | TEMPERATURE: 98.6 F | SYSTOLIC BLOOD PRESSURE: 144 MMHG | OXYGEN SATURATION: 90 % | BODY MASS INDEX: 22.25 KG/M2 | HEIGHT: 60 IN | DIASTOLIC BLOOD PRESSURE: 93 MMHG

## 2025-06-27 VITALS
WEIGHT: 114.42 LBS | TEMPERATURE: 97.3 F | BODY MASS INDEX: 22.46 KG/M2 | RESPIRATION RATE: 16 BRPM | HEART RATE: 91 BPM | OXYGEN SATURATION: 99 % | HEIGHT: 60 IN

## 2025-06-27 DIAGNOSIS — I10 HYPERTENSION, UNSPECIFIED TYPE: Primary | ICD-10-CM

## 2025-06-27 LAB
ATRIAL RATE: 85 BPM
P AXIS: 39 DEGREES
P OFFSET: 200 MS
P ONSET: 156 MS
PR INTERVAL: 130 MS
Q ONSET: 221 MS
QRS COUNT: 14 BEATS
QRS DURATION: 64 MS
QT INTERVAL: 348 MS
QTC CALCULATION(BAZETT): 414 MS
QTC FREDERICIA: 390 MS
R AXIS: 60 DEGREES
T AXIS: 50 DEGREES
T OFFSET: 395 MS
VENTRICULAR RATE: 85 BPM

## 2025-06-27 PROCEDURE — 93005 ELECTROCARDIOGRAM TRACING: CPT

## 2025-06-27 PROCEDURE — 99283 EMERGENCY DEPT VISIT LOW MDM: CPT | Performed by: EMERGENCY MEDICINE

## 2025-06-27 RX ORDER — LABETALOL HYDROCHLORIDE 5 MG/ML
5 INJECTION, SOLUTION INTRAVENOUS ONCE AS NEEDED
Status: CANCELLED | OUTPATIENT
Start: 2025-06-27

## 2025-06-27 RX ORDER — ONDANSETRON HYDROCHLORIDE 2 MG/ML
4 INJECTION, SOLUTION INTRAVENOUS ONCE AS NEEDED
Status: CANCELLED | OUTPATIENT
Start: 2025-06-27

## 2025-06-27 RX ORDER — ALBUTEROL SULFATE 0.83 MG/ML
2.5 SOLUTION RESPIRATORY (INHALATION) ONCE
Status: CANCELLED | OUTPATIENT
Start: 2025-06-27

## 2025-06-27 RX ORDER — SODIUM CHLORIDE, SODIUM LACTATE, POTASSIUM CHLORIDE, CALCIUM CHLORIDE 600; 310; 30; 20 MG/100ML; MG/100ML; MG/100ML; MG/100ML
100 INJECTION, SOLUTION INTRAVENOUS CONTINUOUS
Status: CANCELLED | OUTPATIENT
Start: 2025-06-27 | End: 2025-06-27

## 2025-06-27 RX ORDER — FENTANYL CITRATE 50 UG/ML
25 INJECTION, SOLUTION INTRAMUSCULAR; INTRAVENOUS EVERY 5 MIN PRN
Refills: 0 | Status: CANCELLED | OUTPATIENT
Start: 2025-06-27

## 2025-06-27 RX ORDER — ACETAMINOPHEN 325 MG/1
650 TABLET ORAL EVERY 4 HOURS PRN
Status: CANCELLED | OUTPATIENT
Start: 2025-06-27

## 2025-06-27 RX ORDER — LIDOCAINE HYDROCHLORIDE 10 MG/ML
0.1 INJECTION, SOLUTION EPIDURAL; INFILTRATION; INTRACAUDAL; PERINEURAL ONCE
Status: CANCELLED | OUTPATIENT
Start: 2025-06-27 | End: 2025-06-27

## 2025-06-27 RX ORDER — OXYCODONE HYDROCHLORIDE 5 MG/1
5 TABLET ORAL EVERY 4 HOURS PRN
Refills: 0 | Status: CANCELLED | OUTPATIENT
Start: 2025-06-27

## 2025-06-27 RX ORDER — FENTANYL CITRATE 50 UG/ML
50 INJECTION, SOLUTION INTRAMUSCULAR; INTRAVENOUS EVERY 5 MIN PRN
Refills: 0 | Status: CANCELLED | OUTPATIENT
Start: 2025-06-27

## 2025-06-27 ASSESSMENT — COLUMBIA-SUICIDE SEVERITY RATING SCALE - C-SSRS
6. HAVE YOU EVER DONE ANYTHING, STARTED TO DO ANYTHING, OR PREPARED TO DO ANYTHING TO END YOUR LIFE?: NO
1. IN THE PAST MONTH, HAVE YOU WISHED YOU WERE DEAD OR WISHED YOU COULD GO TO SLEEP AND NOT WAKE UP?: NO
2. HAVE YOU ACTUALLY HAD ANY THOUGHTS OF KILLING YOURSELF?: NO

## 2025-06-27 ASSESSMENT — PAIN - FUNCTIONAL ASSESSMENT: PAIN_FUNCTIONAL_ASSESSMENT: UNABLE TO SELF-REPORT

## 2025-06-27 NOTE — H&P
History Of Present Illness  Cory Cobian is a 34 y.o. male presenting with hip pain in need of injections.     Past Medical History  Past Medical History:   Diagnosis Date    Cerebral palsy     from [facility] Transfer Sheet    Dependent for mobility     Gastrostomy in place (Multi)     GERD (gastroesophageal reflux disease)     Intellectual disability     Intellectual disability     Nonverbal     Other conditions influencing health status     Pneumonia    Seizures (Multi)     Spastic quadriplegia (Multi)     From [facility] Transfer Sheet       Surgical History  Past Surgical History:   Procedure Laterality Date    OTHER SURGICAL HISTORY      gastrostomy    OTHER SURGICAL HISTORY      Steroid injections under anesthesia        Social History  He reports that he has never smoked. He has never used smokeless tobacco. He reports that he does not drink alcohol and does not use drugs.    Family History  Family History   Problem Relation Name Age of Onset    No Known Problems Mother          Allergies  Patient has no known allergies.    Review of Systems   All other systems reviewed and are negative.       Physical Exam  HENT:      Head: Normocephalic.   Eyes:      Conjunctiva/sclera: Conjunctivae normal.   Cardiovascular:      Rate and Rhythm: Regular rhythm.   Pulmonary:      Effort: Pulmonary effort is normal.   Abdominal:      Palpations: Abdomen is soft.   Musculoskeletal:      Comments: Pain in both hips with motion, especially on the left   Neurological:      Mental Status: He is alert.            Assessment/Plan   Assessment & Plan  CP (cerebral palsy), spastic, quadriplegic (Multi)      Steroid injections of both hips         Samanta Tejada MD

## 2025-06-27 NOTE — ED PROVIDER NOTES
Emergency Department Provider Note       HPI: []  34-year-old male with history of cerebral palsy, with severe intellectual disability wheelchair-bound today comes with high blood pressure.  Patient was supposed to have a border surgery was found to be hypertensive for surgery was canceled sent to the ED for evaluation.  Patient had no complaints.  No reported headache vomiting change in mental status trauma fall.  No seizures.  Shortness of breath.  No history of hypertension no blood pressure medications.    Past history: Advanced cerebral palsy, advanced intellectual disability, seizure disorder    Social: No history of reported tobacco alcohol drug abuse.    REVIEW OF SYSTEMS:    GENERAL.: No weight loss, fatigue, anorexia, insomnia, fever.    EYES: No vision loss, double vision, drainage, eye pain.    ENT: No pharyngitis, dry mouth.    CARDIOPULMONARY: No chest pain, palpitations, syncope, near syncope. No shortness of breath, cough, hemoptysis.    GI: No abdominal pain, change in bowel habits, melena, hematemesis, hematochezia, nausea, vomiting, diarrhea.    : No discharge, dysuria, frequency, urgency, hematuria.    MS: No limb pain, joint pain, joint swelling.    SKIN: No rashes.    PSYCH: No depression, anxiety, suicidality, homicidality.    Review of systems is otherwise negative unless stated above or in history of present illness.  Social history, family history, allergies reviewed.  PHYSICAL EXAM:    GENERAL: Vitals noted, no distress. Alert and oriented  x 0 at his baseline non-toxic.  Wheelchair-bound    EENT: TMs clear. Posterior oropharynx unable to assess because he will not cooperate no meningismus. No LAD.     NECK: Supple. Nontender. No midline tenderness.     CARDIAC: Regular, rate, rhythm. No murmurs rubs or gallops. No JVD    PULMONARY: Lungs clear bilaterally with good aeration. No wheezes rales or rhonchi. No respiratory distress.     ABDOMEN: Soft, nonsurgical. Nontender. No peritoneal  signs. Normoactive bowel sounds. No pulsatile masses.     EXTREMITIES: No peripheral edema. Negative Homans bilaterally, no cords.  2+ bounding possible perfused.    SKIN: No rash. Intact.     NEURO: Patient has advanced liver palsy and severe intense disability patient at his baseline neurologic status alert and awake with no new focal neurodeficits.    MEDICAL DECISION MAKING:      Patient blood pressure in the ED is about 140/80    ED course: Patient remains normotensive remains at his baseline mental status no tachycardia or hypoxia    Impression: #1 hypertension asymptomatic    Plan/MDM: 34-year-old male history of cerebral palsy, severe intellectual disability and seizure disorder comes in with asymptomatic hypertension and blood pressure in the ED is reasonably okay he has no signs of change mental status no signs of acute hypertensive emergency or crisis of stroke or TIA or congestive heart failure.  Patient be discharged back to his facility advised on outpatient follow-up with primary care doctor for further evaluation of hypertension with strict return precaution.                                                   Siobhan Eduardo MD  06/27/25 0997

## 2025-06-27 NOTE — ANESTHESIA PREPROCEDURE EVALUATION
Patient: Cory Cobian    Procedure Information       Date/Time: 06/27/25 1025    Procedure: Bilateral hip steroid injections (Bilateral: Hip) - 160 mg depomedrol total, needs c-arm and less than 30 minutes    Location: [facility] OR 04 / [facility]    Surgeons: Samanta Tejada MD            Relevant Problems   Neuro   (+) Seizure disorder (Multi)      /Renal   (+) Kidney stones      ID   (+) Influenza A       Clinical information reviewed:   Tobacco  Allergies  Meds  Problems  Med Hx  Surg Hx   Fam Hx          NPO Detail:  NPO/Void Status  Date of Last Liquid: 06/27/25  Time of Last Liquid: 0610  Date of Last Solid: 06/26/25  Time of Last Solid: 2000  Last Intake Type: Clear fluids  Time of Last Void: 0900         PHYSICAL EXAM    Anesthesia Plan    History of general anesthesia?: yes  History of complications of general anesthesia?: no    ASA 3   (CANCELLED due to incredibly high BP readings and pt not on BP meds.  Sent to ED)

## 2025-06-30 ENCOUNTER — APPOINTMENT (OUTPATIENT)
Dept: RADIOLOGY | Facility: HOSPITAL | Age: 35
End: 2025-06-30
Payer: MEDICAID

## 2025-06-30 ENCOUNTER — APPOINTMENT (OUTPATIENT)
Dept: CARDIOLOGY | Facility: HOSPITAL | Age: 35
End: 2025-06-30
Payer: MEDICAID

## 2025-06-30 ENCOUNTER — HOSPITAL ENCOUNTER (INPATIENT)
Facility: HOSPITAL | Age: 35
LOS: 3 days | Discharge: HOME | End: 2025-07-03
Attending: INTERNAL MEDICINE | Admitting: INTERNAL MEDICINE
Payer: MEDICAID

## 2025-06-30 DIAGNOSIS — R09.02 HYPOXIA: ICD-10-CM

## 2025-06-30 DIAGNOSIS — J18.9 COMMUNITY ACQUIRED PNEUMONIA OF LEFT LUNG, UNSPECIFIED PART OF LUNG: Primary | ICD-10-CM

## 2025-06-30 LAB
ALBUMIN SERPL BCP-MCNC: 4.6 G/DL (ref 3.4–5)
ALP SERPL-CCNC: 148 U/L (ref 33–120)
ALT SERPL W P-5'-P-CCNC: 20 U/L (ref 10–52)
ANION GAP BLDV CALCULATED.4IONS-SCNC: 4 MMOL/L (ref 10–25)
ANION GAP SERPL CALC-SCNC: 15 MMOL/L (ref 10–20)
AST SERPL W P-5'-P-CCNC: 18 U/L (ref 9–39)
BASE EXCESS BLDV CALC-SCNC: 11.9 MMOL/L (ref -2–3)
BASOPHILS # BLD AUTO: 0.04 X10*3/UL (ref 0–0.1)
BASOPHILS NFR BLD AUTO: 0.6 %
BILIRUB SERPL-MCNC: 0.3 MG/DL (ref 0–1.2)
BNP SERPL-MCNC: 6 PG/ML (ref 0–99)
BODY TEMPERATURE: 37 DEGREES CELSIUS
BUN SERPL-MCNC: 10 MG/DL (ref 6–23)
CA-I BLDV-SCNC: 1.22 MMOL/L (ref 1.1–1.33)
CALCIUM SERPL-MCNC: 9.1 MG/DL (ref 8.6–10.3)
CARDIAC TROPONIN I PNL SERPL HS: 10 NG/L (ref 0–20)
CARDIAC TROPONIN I PNL SERPL HS: 13 NG/L (ref 0–20)
CHLORIDE BLDV-SCNC: 95 MMOL/L (ref 98–107)
CHLORIDE SERPL-SCNC: 96 MMOL/L (ref 98–107)
CO2 SERPL-SCNC: 31 MMOL/L (ref 21–32)
CREAT SERPL-MCNC: 0.61 MG/DL (ref 0.5–1.3)
EGFRCR SERPLBLD CKD-EPI 2021: >90 ML/MIN/1.73M*2
EOSINOPHIL # BLD AUTO: 0.1 X10*3/UL (ref 0–0.7)
EOSINOPHIL NFR BLD AUTO: 1.4 %
ERYTHROCYTE [DISTWIDTH] IN BLOOD BY AUTOMATED COUNT: 11.9 % (ref 11.5–14.5)
FLUAV RNA RESP QL NAA+PROBE: NOT DETECTED
FLUBV RNA RESP QL NAA+PROBE: NOT DETECTED
GLUCOSE BLDV-MCNC: 100 MG/DL (ref 74–99)
GLUCOSE SERPL-MCNC: 89 MG/DL (ref 74–99)
HCO3 BLDV-SCNC: 41.6 MMOL/L (ref 22–26)
HCT VFR BLD AUTO: 53.1 % (ref 41–52)
HCT VFR BLD EST: 53 % (ref 41–52)
HGB BLD-MCNC: 17 G/DL (ref 13.5–17.5)
HGB BLDV-MCNC: 17.7 G/DL (ref 13.5–17.5)
IMM GRANULOCYTES # BLD AUTO: 0.02 X10*3/UL (ref 0–0.7)
IMM GRANULOCYTES NFR BLD AUTO: 0.3 % (ref 0–0.9)
INHALED O2 CONCENTRATION: 21 %
LACTATE BLDV-SCNC: 2 MMOL/L (ref 0.4–2)
LYMPHOCYTES # BLD AUTO: 1.48 X10*3/UL (ref 1.2–4.8)
LYMPHOCYTES NFR BLD AUTO: 20.8 %
MCH RBC QN AUTO: 31.7 PG (ref 26–34)
MCHC RBC AUTO-ENTMCNC: 32 G/DL (ref 32–36)
MCV RBC AUTO: 99 FL (ref 80–100)
MONOCYTES # BLD AUTO: 0.8 X10*3/UL (ref 0.1–1)
MONOCYTES NFR BLD AUTO: 11.3 %
NEUTROPHILS # BLD AUTO: 4.66 X10*3/UL (ref 1.2–7.7)
NEUTROPHILS NFR BLD AUTO: 65.6 %
NRBC BLD-RTO: 0 /100 WBCS (ref 0–0)
OXYHGB MFR BLDV: 55.4 % (ref 45–75)
PCO2 BLDV: 72 MM HG (ref 41–51)
PH BLDV: 7.37 PH (ref 7.33–7.43)
PLATELET # BLD AUTO: 278 X10*3/UL (ref 150–450)
PO2 BLDV: 37 MM HG (ref 35–45)
POTASSIUM BLDV-SCNC: 4.4 MMOL/L (ref 3.5–5.3)
POTASSIUM SERPL-SCNC: 4.3 MMOL/L (ref 3.5–5.3)
PROT SERPL-MCNC: 8.3 G/DL (ref 6.4–8.2)
RBC # BLD AUTO: 5.37 X10*6/UL (ref 4.5–5.9)
SAO2 % BLDV: 57 % (ref 45–75)
SARS-COV-2 RNA RESP QL NAA+PROBE: NOT DETECTED
SODIUM BLDV-SCNC: 136 MMOL/L (ref 136–145)
SODIUM SERPL-SCNC: 138 MMOL/L (ref 136–145)
WBC # BLD AUTO: 7.1 X10*3/UL (ref 4.4–11.3)

## 2025-06-30 PROCEDURE — 84132 ASSAY OF SERUM POTASSIUM: CPT | Performed by: INTERNAL MEDICINE

## 2025-06-30 PROCEDURE — 85025 COMPLETE CBC W/AUTO DIFF WBC: CPT | Performed by: INTERNAL MEDICINE

## 2025-06-30 PROCEDURE — 83880 ASSAY OF NATRIURETIC PEPTIDE: CPT | Performed by: INTERNAL MEDICINE

## 2025-06-30 PROCEDURE — 71045 X-RAY EXAM CHEST 1 VIEW: CPT | Performed by: STUDENT IN AN ORGANIZED HEALTH CARE EDUCATION/TRAINING PROGRAM

## 2025-06-30 PROCEDURE — 94640 AIRWAY INHALATION TREATMENT: CPT

## 2025-06-30 PROCEDURE — 87040 BLOOD CULTURE FOR BACTERIA: CPT | Mod: AHULAB | Performed by: INTERNAL MEDICINE

## 2025-06-30 PROCEDURE — 36415 COLL VENOUS BLD VENIPUNCTURE: CPT | Performed by: INTERNAL MEDICINE

## 2025-06-30 PROCEDURE — 93005 ELECTROCARDIOGRAM TRACING: CPT

## 2025-06-30 PROCEDURE — 99285 EMERGENCY DEPT VISIT HI MDM: CPT | Performed by: INTERNAL MEDICINE

## 2025-06-30 PROCEDURE — 2500000002 HC RX 250 W HCPCS SELF ADMINISTERED DRUGS (ALT 637 FOR MEDICARE OP, ALT 636 FOR OP/ED): Performed by: INTERNAL MEDICINE

## 2025-06-30 PROCEDURE — 96365 THER/PROPH/DIAG IV INF INIT: CPT

## 2025-06-30 PROCEDURE — 84484 ASSAY OF TROPONIN QUANT: CPT | Performed by: INTERNAL MEDICINE

## 2025-06-30 PROCEDURE — 99222 1ST HOSP IP/OBS MODERATE 55: CPT | Performed by: INTERNAL MEDICINE

## 2025-06-30 PROCEDURE — 1100000001 HC PRIVATE ROOM DAILY

## 2025-06-30 PROCEDURE — 87636 SARSCOV2 & INF A&B AMP PRB: CPT | Performed by: INTERNAL MEDICINE

## 2025-06-30 PROCEDURE — 71045 X-RAY EXAM CHEST 1 VIEW: CPT

## 2025-06-30 PROCEDURE — 2500000001 HC RX 250 WO HCPCS SELF ADMINISTERED DRUGS (ALT 637 FOR MEDICARE OP): Performed by: INTERNAL MEDICINE

## 2025-06-30 PROCEDURE — 87077 CULTURE AEROBIC IDENTIFY: CPT | Mod: AHULAB | Performed by: INTERNAL MEDICINE

## 2025-06-30 PROCEDURE — 93010 ELECTROCARDIOGRAM REPORT: CPT | Performed by: INTERNAL MEDICINE

## 2025-06-30 PROCEDURE — 2500000004 HC RX 250 GENERAL PHARMACY W/ HCPCS (ALT 636 FOR OP/ED): Mod: JZ | Performed by: INTERNAL MEDICINE

## 2025-06-30 PROCEDURE — 96375 TX/PRO/DX INJ NEW DRUG ADDON: CPT

## 2025-06-30 RX ORDER — PHENOBARBITAL 97.2 MG/1
97.2 TABLET ORAL 2 TIMES DAILY
COMMUNITY
Start: 2025-06-12

## 2025-06-30 RX ORDER — CEFTRIAXONE 1 G/50ML
1 INJECTION, SOLUTION INTRAVENOUS ONCE
Status: COMPLETED | OUTPATIENT
Start: 2025-06-30 | End: 2025-06-30

## 2025-06-30 RX ORDER — BACLOFEN 10 MG/1
20 TABLET ORAL 4 TIMES DAILY
Status: DISCONTINUED | OUTPATIENT
Start: 2025-06-30 | End: 2025-07-03 | Stop reason: HOSPADM

## 2025-06-30 RX ORDER — PHENOBARBITAL 32.4 MG/1
97.2 TABLET ORAL 2 TIMES DAILY
Status: DISCONTINUED | OUTPATIENT
Start: 2025-06-30 | End: 2025-07-03 | Stop reason: HOSPADM

## 2025-06-30 RX ORDER — ESOMEPRAZOLE MAGNESIUM 20 MG/1
20 GRANULE, DELAYED RELEASE ORAL EVERY MORNING
COMMUNITY
Start: 2024-09-24

## 2025-06-30 RX ORDER — PHENOBARBITAL 32.4 MG/1
16.2 TABLET ORAL 2 TIMES DAILY
Status: DISCONTINUED | OUTPATIENT
Start: 2025-06-30 | End: 2025-07-03 | Stop reason: HOSPADM

## 2025-06-30 RX ORDER — POLYETHYLENE GLYCOL 3350 17 G/17G
17 POWDER, FOR SOLUTION ORAL DAILY PRN
Status: DISCONTINUED | OUTPATIENT
Start: 2025-06-30 | End: 2025-07-03 | Stop reason: HOSPADM

## 2025-06-30 RX ORDER — PANTOPRAZOLE SODIUM 40 MG/10ML
40 INJECTION, POWDER, LYOPHILIZED, FOR SOLUTION INTRAVENOUS
Status: DISCONTINUED | OUTPATIENT
Start: 2025-07-01 | End: 2025-07-02

## 2025-06-30 RX ORDER — BISACODYL 10 MG/1
10 SUPPOSITORY RECTAL
Status: DISCONTINUED | OUTPATIENT
Start: 2025-06-30 | End: 2025-07-03 | Stop reason: HOSPADM

## 2025-06-30 RX ORDER — ACETAMINOPHEN 160 MG/5ML
650 SOLUTION ORAL EVERY 4 HOURS PRN
Status: DISCONTINUED | OUTPATIENT
Start: 2025-06-30 | End: 2025-07-03 | Stop reason: HOSPADM

## 2025-06-30 RX ORDER — ONDANSETRON 4 MG/1
4 TABLET, FILM COATED ORAL EVERY 8 HOURS PRN
Status: DISCONTINUED | OUTPATIENT
Start: 2025-06-30 | End: 2025-07-03 | Stop reason: HOSPADM

## 2025-06-30 RX ORDER — ENOXAPARIN SODIUM 100 MG/ML
40 INJECTION SUBCUTANEOUS EVERY 24 HOURS
Status: DISCONTINUED | OUTPATIENT
Start: 2025-06-30 | End: 2025-07-03 | Stop reason: HOSPADM

## 2025-06-30 RX ORDER — IPRATROPIUM BROMIDE AND ALBUTEROL SULFATE 2.5; .5 MG/3ML; MG/3ML
3 SOLUTION RESPIRATORY (INHALATION) EVERY 20 MIN
Status: COMPLETED | OUTPATIENT
Start: 2025-06-30 | End: 2025-06-30

## 2025-06-30 RX ORDER — IPRATROPIUM BROMIDE AND ALBUTEROL SULFATE 2.5; .5 MG/3ML; MG/3ML
3 SOLUTION RESPIRATORY (INHALATION)
Status: DISCONTINUED | OUTPATIENT
Start: 2025-06-30 | End: 2025-06-30

## 2025-06-30 RX ORDER — GUAIFENESIN 600 MG/1
600 TABLET, EXTENDED RELEASE ORAL EVERY 12 HOURS PRN
Status: DISCONTINUED | OUTPATIENT
Start: 2025-06-30 | End: 2025-07-03 | Stop reason: HOSPADM

## 2025-06-30 RX ORDER — ACETAMINOPHEN 325 MG/1
650 TABLET ORAL EVERY 4 HOURS PRN
Status: DISCONTINUED | OUTPATIENT
Start: 2025-06-30 | End: 2025-07-03 | Stop reason: HOSPADM

## 2025-06-30 RX ORDER — POLYETHYLENE GLYCOL 3350 17 G/17G
17 POWDER, FOR SOLUTION ORAL DAILY PRN
Status: DISCONTINUED | OUTPATIENT
Start: 2025-06-30 | End: 2025-06-30

## 2025-06-30 RX ORDER — IPRATROPIUM BROMIDE AND ALBUTEROL SULFATE 2.5; .5 MG/3ML; MG/3ML
3 SOLUTION RESPIRATORY (INHALATION)
Status: DISCONTINUED | OUTPATIENT
Start: 2025-06-30 | End: 2025-07-03 | Stop reason: HOSPADM

## 2025-06-30 RX ORDER — ONDANSETRON HYDROCHLORIDE 2 MG/ML
4 INJECTION, SOLUTION INTRAVENOUS EVERY 8 HOURS PRN
Status: DISCONTINUED | OUTPATIENT
Start: 2025-06-30 | End: 2025-07-03 | Stop reason: HOSPADM

## 2025-06-30 RX ORDER — IPRATROPIUM BROMIDE AND ALBUTEROL SULFATE 2.5; .5 MG/3ML; MG/3ML
3 SOLUTION RESPIRATORY (INHALATION) EVERY 2 HOUR PRN
Status: DISCONTINUED | OUTPATIENT
Start: 2025-06-30 | End: 2025-07-03 | Stop reason: HOSPADM

## 2025-06-30 RX ORDER — PHENOBARBITAL 16.2 MG/1
TABLET ORAL
COMMUNITY
Start: 2025-06-17

## 2025-06-30 RX ORDER — DIAZEPAM 10 MG/100UL
1 SPRAY NASAL ONCE AS NEEDED
COMMUNITY

## 2025-06-30 RX ORDER — PANTOPRAZOLE SODIUM 40 MG/1
40 TABLET, DELAYED RELEASE ORAL
Status: DISCONTINUED | OUTPATIENT
Start: 2025-07-01 | End: 2025-07-02

## 2025-06-30 RX ORDER — TALC
3 POWDER (GRAM) TOPICAL NIGHTLY PRN
Status: DISCONTINUED | OUTPATIENT
Start: 2025-06-30 | End: 2025-07-03 | Stop reason: HOSPADM

## 2025-06-30 RX ORDER — CEFTRIAXONE 1 G/50ML
1 INJECTION, SOLUTION INTRAVENOUS EVERY 24 HOURS
Status: DISCONTINUED | OUTPATIENT
Start: 2025-07-01 | End: 2025-07-03

## 2025-06-30 RX ORDER — ACETAMINOPHEN 650 MG/1
650 SUPPOSITORY RECTAL EVERY 4 HOURS PRN
Status: DISCONTINUED | OUTPATIENT
Start: 2025-06-30 | End: 2025-07-03 | Stop reason: HOSPADM

## 2025-06-30 RX ADMIN — PHENOBARBITAL 97.2 MG: 32.4 TABLET ORAL at 23:34

## 2025-06-30 RX ADMIN — BACLOFEN 20 MG: 10 TABLET ORAL at 23:32

## 2025-06-30 RX ADMIN — CEFTRIAXONE 1 G: 1 INJECTION, SOLUTION INTRAVENOUS at 13:09

## 2025-06-30 RX ADMIN — METHYLPREDNISOLONE SODIUM SUCCINATE 125 MG: 125 INJECTION, POWDER, FOR SOLUTION INTRAMUSCULAR; INTRAVENOUS at 13:09

## 2025-06-30 RX ADMIN — PHENOBARBITAL 16.2 MG: 32.4 TABLET ORAL at 23:33

## 2025-06-30 RX ADMIN — IPRATROPIUM BROMIDE AND ALBUTEROL SULFATE 3 ML: 2.5; .5 SOLUTION RESPIRATORY (INHALATION) at 19:54

## 2025-06-30 RX ADMIN — AZITHROMYCIN MONOHYDRATE 500 MG: 500 INJECTION, POWDER, LYOPHILIZED, FOR SOLUTION INTRAVENOUS at 13:41

## 2025-06-30 RX ADMIN — IPRATROPIUM BROMIDE AND ALBUTEROL SULFATE 3 ML: 2.5; .5 SOLUTION RESPIRATORY (INHALATION) at 12:30

## 2025-06-30 RX ADMIN — IPRATROPIUM BROMIDE AND ALBUTEROL SULFATE 3 ML: 2.5; .5 SOLUTION RESPIRATORY (INHALATION) at 12:29

## 2025-06-30 RX ADMIN — IPRATROPIUM BROMIDE AND ALBUTEROL SULFATE 3 ML: 2.5; .5 SOLUTION RESPIRATORY (INHALATION) at 12:28

## 2025-06-30 SDOH — SOCIAL STABILITY: SOCIAL INSECURITY: DOES ANYONE TRY TO KEEP YOU FROM HAVING/CONTACTING OTHER FRIENDS OR DOING THINGS OUTSIDE YOUR HOME?: UNABLE TO ASSESS

## 2025-06-30 SDOH — SOCIAL STABILITY: SOCIAL INSECURITY: DO YOU FEEL ANYONE HAS EXPLOITED OR TAKEN ADVANTAGE OF YOU FINANCIALLY OR OF YOUR PERSONAL PROPERTY?: UNABLE TO ASSESS

## 2025-06-30 SDOH — SOCIAL STABILITY: SOCIAL INSECURITY: DO YOU FEEL UNSAFE GOING BACK TO THE PLACE WHERE YOU ARE LIVING?: UNABLE TO ASSESS

## 2025-06-30 SDOH — SOCIAL STABILITY: SOCIAL INSECURITY: HAVE YOU HAD THOUGHTS OF HARMING ANYONE ELSE?: UNABLE TO ASSESS

## 2025-06-30 SDOH — SOCIAL STABILITY: SOCIAL INSECURITY: ABUSE: ADULT

## 2025-06-30 SDOH — SOCIAL STABILITY: SOCIAL INSECURITY: ARE YOU OR HAVE YOU BEEN THREATENED OR ABUSED PHYSICALLY, EMOTIONALLY, OR SEXUALLY BY ANYONE?: UNABLE TO ASSESS

## 2025-06-30 SDOH — SOCIAL STABILITY: SOCIAL INSECURITY: HAVE YOU HAD ANY THOUGHTS OF HARMING ANYONE ELSE?: UNABLE TO ASSESS

## 2025-06-30 SDOH — SOCIAL STABILITY: SOCIAL INSECURITY: ARE THERE ANY APPARENT SIGNS OF INJURIES/BEHAVIORS THAT COULD BE RELATED TO ABUSE/NEGLECT?: UNABLE TO ASSESS

## 2025-06-30 SDOH — SOCIAL STABILITY: SOCIAL INSECURITY: WERE YOU ABLE TO COMPLETE ALL THE BEHAVIORAL HEALTH SCREENINGS?: NO

## 2025-06-30 SDOH — SOCIAL STABILITY: SOCIAL INSECURITY: HAS ANYONE EVER THREATENED TO HURT YOUR FAMILY OR YOUR PETS?: UNABLE TO ASSESS

## 2025-06-30 ASSESSMENT — ACTIVITIES OF DAILY LIVING (ADL)
HEARING - RIGHT EAR: UNABLE TO ASSESS
LACK_OF_TRANSPORTATION: PATIENT UNABLE TO ANSWER
ADEQUATE_TO_COMPLETE_ADL: UNABLE TO ASSESS
BATHING: DEPENDENT
DRESSING YOURSELF: DEPENDENT
GROOMING: DEPENDENT
FEEDING YOURSELF: DEPENDENT
WALKS IN HOME: DEPENDENT
PATIENT'S MEMORY ADEQUATE TO SAFELY COMPLETE DAILY ACTIVITIES?: UNABLE TO ASSESS
HEARING - LEFT EAR: UNABLE TO ASSESS
ASSISTIVE_DEVICE: WHEELCHAIR
JUDGMENT_ADEQUATE_SAFELY_COMPLETE_DAILY_ACTIVITIES: UNABLE TO ASSESS
TOILETING: DEPENDENT

## 2025-06-30 ASSESSMENT — PATIENT HEALTH QUESTIONNAIRE - PHQ9
2. FEELING DOWN, DEPRESSED OR HOPELESS: NOT AT ALL
1. LITTLE INTEREST OR PLEASURE IN DOING THINGS: NOT AT ALL
SUM OF ALL RESPONSES TO PHQ9 QUESTIONS 1 & 2: 0

## 2025-06-30 ASSESSMENT — LIFESTYLE VARIABLES
AUDIT-C TOTAL SCORE: 0
SKIP TO QUESTIONS 9-10: 1
AUDIT-C TOTAL SCORE: 0
HOW MANY STANDARD DRINKS CONTAINING ALCOHOL DO YOU HAVE ON A TYPICAL DAY: PATIENT DOES NOT DRINK
HOW OFTEN DO YOU HAVE A DRINK CONTAINING ALCOHOL: NEVER
HOW OFTEN DO YOU HAVE 6 OR MORE DRINKS ON ONE OCCASION: NEVER

## 2025-06-30 ASSESSMENT — ENCOUNTER SYMPTOMS: SHORTNESS OF BREATH: 1

## 2025-06-30 ASSESSMENT — PAIN SCALES - WONG BAKER: WONGBAKER_NUMERICALRESPONSE: NO HURT

## 2025-06-30 ASSESSMENT — COGNITIVE AND FUNCTIONAL STATUS - GENERAL: PATIENT BASELINE BEDBOUND: YES

## 2025-06-30 ASSESSMENT — PAIN - FUNCTIONAL ASSESSMENT: PAIN_FUNCTIONAL_ASSESSMENT: WONG-BAKER FACES

## 2025-06-30 NOTE — ED TRIAGE NOTES
Pt to ED via EMS from group home with c/o shortness of breath. Pt is nonverbal at baseline. 84% room air for medics.

## 2025-06-30 NOTE — PROGRESS NOTES
06/30/25 1321   [facility] Disability Status   Are you deaf or do you have serious difficulty hearing? N   Are you blind or do you have serious difficulty seeing, even when wearing glasses? N   Because of a physical, mental, or emotional condition, do you have serious difficulty concentrating, remembering, or making decisions? (5 years old or older) Y  (C.P., quadraplegia)   Do you have serious difficulty walking or climbing stairs? Y  (WC bound)   Do you have serious difficulty dressing or bathing? Y   Because of a physical, mental, or emotional condition, do you have serious difficulty doing errands alone such as visiting the doctor? Y

## 2025-06-30 NOTE — PROGRESS NOTES
Transitional Care Coordination Progress Note:  Plan per Medical/Surgical team: treatment of Pn, hypoxia, R/O covid with oxygen & IV ATB, IV solumedrol, duoneb  Status: Inpatient  Payor source: medicaid  Discharge disposition: Count includes the [facility]  [phone] nurse station  Potential Barriers: PEG, WC bound, C.P., ?need oxygen @ home   ADOD: 7/2/2025     MUST CALL DR bob DEGROOT report prior to DC:  Dr Kelly Delgadillo [phone]     L Linda Leos RN, BSN Transitional Care Coordinator ED# [phone]      06/30/25 1321   Discharge Planning   Living Arrangements Friends   Support Systems Parent   Assistance Needed ?need oxygen @ home set up   Type of Residence Group home   Do you have animals or pets at home? No   Care Facility Name Count includes the [facility] [phone] nurse station  MUST CALL DR bob DEGROOT report prior to DC:  Dr Kelly Delgadillo [phone]   Home or Post Acute Services In home services   Type of Home Care Services Home health aide   Expected Discharge Disposition Home   Does the patient need discharge transport arranged? Yes   RoundTrip coordination needed? Yes   Has discharge transport been arranged? No   Financial Resource Strain   How hard is it for you to pay for the very basics like food, housing, medical care, and heating? Pt Unable   Housing Stability   In the last 12 months, was there a time when you were not able to pay the mortgage or rent on time? Pt Unable   In the past 12 months, how many times have you moved where you were living? 0   At any time in the past 12 months, were you homeless or living in a shelter (including now)? Pt Unable   Transportation Needs   In the past 12 months, has lack of transportation kept you from medical appointments or from getting medications? Pt Unable   In the past 12 months, has lack of transportation kept you from meetings, work, or from getting things needed for daily living? Pt Unable   Stroke Family Assessment   Stroke Family Assessment  Needed No   Intensity of Service   Intensity of Service 0-30 min

## 2025-06-30 NOTE — ED PROVIDER NOTES
"HPI     CC: Shortness of Breath     HPI: Cory Cobian is a 34 y.o. male with a history of CP with profound intellectual disability, seizure disorder, nonverbal, spastic quadriplegia, recurrent hospital admissions for hypoxia in the setting of viral illness, who presents with hypoxia and abnormal lung sounds.  He currently lives at [facility].  He is nonverbal but his caregiver is at bedside.  She states that over the weekend he developed labored breathing.  They have had to apply oxygen which she has as needed but has not needed in years.  They have been giving albuterol that his oxygenation stays low.  He does not seem to be coughing.  He does feed both by mouth and through his G-tube, has not had any coughing episodes.  No known fever.  He seems to be at his mental status baseline.  He is having normal bowel movements and urine output.  She states that he was hospitalized 5 years ago for a similar presentation, has had as needed oxygen prescribed since.  He does not have a known diagnosis of asthma or COPD.  Per the notes that were sent over with him, he has a history of \"respiratory and airway impairment over 5 years ago.\"  On review of EMR he was hospitalized in February of last year with hypoxia related to influenza.  Previous to that he was hospitalized in December 2022 with COVID.    ROS: 10-point review of systems was performed and is otherwise negative except as noted in HPI.    Limitations to history: Nonverbal    Independent Historians: Caregiver at bedside    External Records Reviewed: Outpatient notes in EMR, prior DC summaries from last 2 years    Past Medical History: Noncontributory except per HPI     Past Surgical History: Noncontributory except per HPI     Family History: Reviewed and noncontributory     Social History:  Denies tobacco. Denies ETOH. Denies illicit drugs.    Social Determinants Affecting Care: N/A    RX Allergies[1]    Home Meds:   Current Outpatient Medications "   Medication Instructions    acetaminophen (TYLENOL) 650 mg, Every 4 hours PRN    albuterol 90 mcg/actuation inhaler 2 puffs, 4 times daily PRN    baclofen (LIORESAL) 20 mg, 4 times daily    bisacodyl (DULCOLAX) 10 mg, Every 72 hours PRN    calcium carbonate-vitamin D3 500 mg-5 mcg (200 unit) tablet 1 tablet, Daily    diazePAM (Diastat AcuDiaL) 5-7.5-10 mg rectal kit 10 mg, Every 6 hours PRN    esomeprazole (NEXIUM) 20 mg, Daily before breakfast    ibuprofen 100 mg/5 mL suspension 20 mL, Every 6 hours PRN    multivitamin/iron/folic acid (CENTRUM ORAL) 15 mL, Daily    PHENobarbital (LUMINAL) 100 mg, oral, 2 times daily    PHENobarbital (LUMINAL) 15 mg, oral, 2 times daily, Take one tablet twice daily along with 100mg BID    polyethylene glycol 3350 (MIRALAX ORAL) 17 g, 2 times daily    simethicone (Mylicon) 125 mg chewable tablet 1 tablet, Every 6 hours PRN        Physical Exam     ED Triage Vitals [06/30/25 1052]   Temperature Heart Rate Respirations BP   36.5 °C (97.7 °F) (!) 102 18 (!) 174/108      Pulse Ox Temp src Heart Rate Source Patient Position   94 % -- -- --      BP Location FiO2 (%)     -- --         Heart Rate:  []   Temperature:  [36.5 °C (97.7 °F)]   Respirations:  [18]   BP: (140-174)/()   Pulse Ox:  [94 %-98 %]      Physical Exam  Vitals and nursing note reviewed.     CONSTITUTIONAL: Chronically ill-appearing, in no acute distress  HENMT: Head atraumatic. Airway patent. Nasal mucosa clear. Mouth with normal mucosa, clear oropharynx. Uvula midline. Neck supple.    EYES: Clear bilaterally, pupils equally round and reactive to light.   CARDIOVASCULAR: Normal rate, regular rhythm.  Heart sounds S1, S2.  No murmurs, rubs or gallops. Normal pulses. Capillary refill < 2 sec.   RESPIRATORY: No increased work of breathing.  Breath sounds diminished throughout, patient not taking deep breaths/unable to follow commands.  Scattered expiratory wheeze throughout.  GASTROINTESTINAL: Abdomen soft,  non-distended, non-tender. No rebound, no guarding. Normal bowel sounds. No palpable masses.  GENITOURINARY:  No CVA tenderness.  MUSCULOSKELETAL: No muscle or joint tenderness. No edema.   NEUROLOGICAL: Awake but nonresponsive verbally.  Spastic quadriplegia.  SKIN: Warm, dry and intact. No rash or notable lesions.  PSYCHIATRIC: Unable to assess  HEME/LYMPH: No adenopathy or splenomegaly.    Diagnostic Results      ECG: ECGs read and interpreted by me. See ED Course, below, for interpretation.    Labs Reviewed   CBC WITH AUTO DIFFERENTIAL - Abnormal       Result Value    WBC 7.1      nRBC 0.0      RBC 5.37      Hemoglobin 17.0      Hematocrit 53.1 (*)     MCV 99      MCH 31.7      MCHC 32.0      RDW 11.9      Platelets 278      Neutrophils % 65.6      Immature Granulocytes %, Automated 0.3      Lymphocytes % 20.8      Monocytes % 11.3      Eosinophils % 1.4      Basophils % 0.6      Neutrophils Absolute 4.66      Immature Granulocytes Absolute, Automated 0.02      Lymphocytes Absolute 1.48      Monocytes Absolute 0.80      Eosinophils Absolute 0.10      Basophils Absolute 0.04     COMPREHENSIVE METABOLIC PANEL - Abnormal    Glucose 89      Sodium 138      Potassium 4.3      Chloride 96 (*)     Bicarbonate 31      Anion Gap 15      Urea Nitrogen 10      Creatinine 0.61      eGFR >90      Calcium 9.1      Albumin 4.6      Alkaline Phosphatase 148 (*)     Total Protein 8.3 (*)     AST 18      Bilirubin, Total 0.3      ALT 20     BLOOD GAS VENOUS FULL PANEL - Abnormal    POCT pH, Venous 7.37      POCT pCO2, Venous 72 (*)     POCT pO2, Venous 37      POCT SO2, Venous 57      POCT Oxy Hemoglobin, Venous 55.4      POCT Hematocrit Calculated, Venous 53.0 (*)     POCT Sodium, Venous 136      POCT Potassium, Venous 4.4      POCT Chloride, Venous 95 (*)     POCT Ionized Calicum, Venous 1.22      POCT Glucose, Venous 100 (*)     POCT Lactate, Venous 2.0      POCT Base Excess, Venous 11.9 (*)     POCT HCO3 Calculated, Venous  41.6 (*)     POCT Hemoglobin, Venous 17.7 (*)     POCT Anion Gap, Venous 4.0 (*)     Patient Temperature 37.0      FiO2 21     B-TYPE NATRIURETIC PEPTIDE - Normal    BNP 6      Narrative:        <100 pg/mL - Heart failure unlikely  100-299 pg/mL - Intermediate probability of acute heart                  failure exacerbation. Correlate with clinical                  context and patient history.    >=300 pg/mL - Heart Failure likely. Correlate with clinical                  context and patient history.    BNP testing is performed using different testing methodology at [facility] than at other [facility]. Direct result comparisons should only be made within the same method.      SERIAL TROPONIN-INITIAL - Normal    Troponin I, High Sensitivity 13      Narrative:     Less than 99th percentile of normal range cutoff-  Female and children under 18 years old <14 ng/L; Male <21 ng/L: Negative  Repeat testing should be performed if clinically indicated.     Female and children under 18 years old 14-50 ng/L; Male 21-50 ng/L:  Consistent with possible cardiac damage and possible increased clinical   risk. Serial measurements may help to assess extent of myocardial damage.     >50 ng/L: Consistent with cardiac damage, increased clinical risk and  myocardial infarction. Serial measurements may help assess extent of   myocardial damage.      NOTE: Children less than 1 year old may have higher baseline troponin   levels and results should be interpreted in conjunction with the overall   clinical context.     NOTE: Troponin I testing is performed using a different   testing methodology at [facility] than at other   [facility]. Direct result comparisons should only   be made within the same method.   SERIAL TROPONIN, 1 HOUR - Normal    Troponin I, High Sensitivity 10      Narrative:     Less than 99th percentile of normal range cutoff-  Female and children under 18 years old <14 ng/L; Male <21  ng/L: Negative  Repeat testing should be performed if clinically indicated.     Female and children under 18 years old 14-50 ng/L; Male 21-50 ng/L:  Consistent with possible cardiac damage and possible increased clinical   risk. Serial measurements may help to assess extent of myocardial damage.     >50 ng/L: Consistent with cardiac damage, increased clinical risk and  myocardial infarction. Serial measurements may help assess extent of   myocardial damage.      NOTE: Children less than 1 year old may have higher baseline troponin   levels and results should be interpreted in conjunction with the overall   clinical context.     NOTE: Troponin I testing is performed using a different   testing methodology at [facility] than at other   [facility]. Direct result comparisons should only   be made within the same method.   BLOOD CULTURE   BLOOD CULTURE   TROPONIN SERIES- (INITIAL, 1 HR)    Narrative:     The following orders were created for panel order Troponin I Series, High Sensitivity (0, 1 HR).  Procedure                               Abnormality         Status                     ---------                               -----------         ------                     Troponin I, High Sensiti...[[id number]]  Normal              Final result               Troponin, High Sensitivi...[[id number]]  Normal              Preliminary result           Please view results for these tests on the individual orders.   SARS-COV-2 AND INFLUENZA A/B PCR         XR chest 1 view   Final Result   1.  Mild patchy left suprahilar opacity may reflect a developing   consolidation.             MACRO:   None        Signed by: Jesse Dinh 6/30/2025 11:48 AM   Dictation workstation:   [id number]                    No data recorded                Procedure  Critical Care    Performed by: Samanta Arango MD  Authorized by: Samanta Arango MD    Critical care provider statement:     Critical care time (minutes):  25     Critical care time was exclusive of:  Separately billable procedures and treating other patients and teaching time    Critical care was necessary to treat or prevent imminent or life-threatening deterioration of the following conditions:  Respiratory failure    Critical care was time spent personally by me on the following activities:  Development of treatment plan with patient or surrogate, evaluation of patient's response to treatment, examination of patient, obtaining history from patient or surrogate, ordering and performing treatments and interventions, ordering and review of radiographic studies, ordering and review of laboratory studies, pulse oximetry, re-evaluation of patient's condition and review of old charts    Care discussed with: admitting provider        ED Course & MDM   Assessment/Plan:   Cory Cobian is a 34 y.o. male with a history of CP with profound intellectual disability, seizure disorder, nonverbal, spastic quadriplegia, recurrent hospital admissions for hypoxia in the setting of viral illness, who presents with hypoxia at his group home, not improving with albuterol and oxygen therapy.  Lung exam is difficult as patient is not taking deep breaths/following commands.  He does have notable wheeze and diminished breath sounds throughout.  No other focal findings, he appears to be at his neurologic baseline.  He is mildly hypertensive and hypoxic on presentation.  He was saturating 84% on room air per EMS and was placed on nasal cannula here.  Will need to rule out occult viral or bacterial lung infection.  Low suspicion PE in the absence of tachycardia or signs of DVT on exam.  Workup was initiated with ECG, labs, chest x-ray.  He was given DuoNebs x 3 and Solu-Medrol.  See below for details of ED course and ultimate disposition.    Medications   cefTRIAXone (Rocephin) 1 g in dextrose (iso) IV 50 mL (1 g intravenous New Bag 6/30/25 5063)   azithromycin (Zithromax) 500 mg in dextrose 5% IV  250 mL (has no administration in time range)   ipratropium-albuteroL (Duo-Neb) 0.5-2.5 mg/3 mL nebulizer solution 3 mL (3 mL nebulization Given 6/30/25 1230)   methylPREDNISolone sod succinate (SOLU-Medrol) injection 125 mg (125 mg intravenous Given 6/30/25 1309)        ED Course as of 06/30/25 1312   Mon Jun 30, 2025   1228 ECG read interpreted by me.  Sinus tachycardia, rate 109.  Normal axis.  Normal intervals.  No significant ST or T wave derangements.  J-point elevation inferior lateral leads without reciprocal changes [CG]   1228 Labs are notable for CBC without leukocytosis, normal H&H, normal platelets, CMP with elevated alkaline phosphatase no other acute findings, normal troponin, blood gas with chronically elevated pCO2 72 with normal pH 7.37 and elevated bicarb of 41.6 (compensated respiratory acidosis) [CG]   1228 CXR Mild patchy left suprahilar opacity may reflect a developing consolidation.   [CG]   1228 Blood cultures were obtained.  Patient was started empirically on ceftriaxone azithromycin. [CG]   1312 Patient was accepted for admission by Dr. Davis to IM. [CG]      ED Course User Index  [CG] Samanta Arango MD         Diagnoses as of 06/30/25 1312   Community acquired pneumonia of left lung, unspecified part of lung   Hypoxia       Disposition:   Admitted to IM, discussed differential and findings with patient as well as any family members at bedside.      ED Prescriptions    None         Samanta Arango MD  EM/IM/Peds    This note was dictated by speech recognition. Minor errors in transcription may be present.         [1] No Known Allergies       Samanta Arango MD  06/30/25 1312

## 2025-06-30 NOTE — PROGRESS NOTES
Pharmacy Medication History     Source of Information: per med list from [facility]    Additional concerns with the patient's PTA list.   N/a  Notified Provider via Haiku : No    The following updates were made to the Prior to Admission medication list:     Medications ADDED:   N/a  Medications CHANGED:  Diazepam isn't rectal it is spray ( valtoco )  Phenobarbital show dose of 16.2mg and 97.2 mg twice daily   Medications REMOVED:   N/a  Medications NOT TAKING:   N/a    Allergy reviewed : No    Meds 2 Beds : N/A    Outpatient pharmacy confirmed and updated in chart : N/A    Pharmacy name: [facility]     The list below reflectives the updated PTA list. Please review each medication in order reconciliation for additional clarification and justification.    Prior to Admission Medications   Prescriptions Last Dose   NexIUM Packet 20 mg packet 6/30/2025   Sig: Take 20 mg by mouth once daily in the morning.   PHENobarbital (Luminal) 16.2 mg tablet 6/30/2025   Sig: Take 1 tablet (15 mg) by mouth 2 times a day. Take one tablet twice daily along with 100mg BID   PHENobarbital (Luminal) 97.2 mg tablet 6/30/2025   Sig: Take 1 tablet (97.2 mg) by mouth 2 times a day.   acetaminophen (Tylenol) 325 mg tablet    Sig: Take 2 tablets (650 mg) by g-tube every 4 hours if needed for mild pain (1 - 3) or fever (temp greater than 38.0 C).   albuterol 90 mcg/actuation inhaler 6/30/2025   Sig: Inhale 2 puffs every 1 hour if needed for shortness of breath or wheezing (respitory distress).   baclofen (Lioresal) 20 mg tablet 6/30/2025   Sig: Take 1 tablet (20 mg) by mouth 4 times a day.   bisacodyl (Dulcolax) 10 mg suppository    Sig: Insert 1 suppository (10 mg) into the rectum every 3rd day if needed for constipation.   calcium carbonate-vitamin D3 500 mg-5 mcg (200 unit) tablet 6/30/2025   Sig: Take 1 tablet by g-tube once daily.   diazePAM (Valtoco) 10 mg/spray (0.1 mL) spray,non-aerosol nasal spray    Sig: Administer 1 spray into one  nostril 1 time if needed for seizures (if seizure lasts longer than 5 minutes. may repeat in 4 hours if needed).   ibuprofen 100 mg/5 mL suspension    Sig: Take 20 mL (400 mg) by g-tube every 6 hours if needed for fever (temp greater than 38.0 C) or mild pain (1 - 3).   multivitamin/iron/folic acid (CENTRUM ORAL) 2025   Sig: 15 mL by peg tube route early in the morning.. LIQUID   polyethylene glycol 3350 (MIRALAX ORAL) 2025 Evening   Si g by peg tube route twice a day. 17g  in 150ml water via g-tube   simethicone (Mylicon) 125 mg chewable tablet    Sig: Take 1 tablet (125 mg) by g-tube every 6 hours if needed for flatulence.      Facility-Administered Medications: None       The list below reflectives the updated allergy list. Please review each documented allergy for additional clarification and justification.    No Known Allergies       25 at 2:16 PM - Pavithra Bedoya

## 2025-07-01 LAB
ANION GAP SERPL CALC-SCNC: 20 MMOL/L (ref 10–20)
BUN SERPL-MCNC: 11 MG/DL (ref 6–23)
CALCIUM SERPL-MCNC: 9 MG/DL (ref 8.6–10.3)
CHLORIDE SERPL-SCNC: 92 MMOL/L (ref 98–107)
CO2 SERPL-SCNC: 30 MMOL/L (ref 21–32)
CREAT SERPL-MCNC: 0.53 MG/DL (ref 0.5–1.3)
EGFRCR SERPLBLD CKD-EPI 2021: >90 ML/MIN/1.73M*2
ERYTHROCYTE [DISTWIDTH] IN BLOOD BY AUTOMATED COUNT: 11.9 % (ref 11.5–14.5)
GLUCOSE BLD MANUAL STRIP-MCNC: 109 MG/DL (ref 74–99)
GLUCOSE SERPL-MCNC: 88 MG/DL (ref 74–99)
HCT VFR BLD AUTO: 47.4 % (ref 41–52)
HGB BLD-MCNC: 15.7 G/DL (ref 13.5–17.5)
MCH RBC QN AUTO: 31.7 PG (ref 26–34)
MCHC RBC AUTO-ENTMCNC: 33.1 G/DL (ref 32–36)
MCV RBC AUTO: 96 FL (ref 80–100)
NRBC BLD-RTO: 0 /100 WBCS (ref 0–0)
PLATELET # BLD AUTO: 275 X10*3/UL (ref 150–450)
POTASSIUM SERPL-SCNC: 3.8 MMOL/L (ref 3.5–5.3)
RBC # BLD AUTO: 4.95 X10*6/UL (ref 4.5–5.9)
SODIUM SERPL-SCNC: 138 MMOL/L (ref 136–145)
WBC # BLD AUTO: 9.4 X10*3/UL (ref 4.4–11.3)

## 2025-07-01 PROCEDURE — 80048 BASIC METABOLIC PNL TOTAL CA: CPT | Performed by: INTERNAL MEDICINE

## 2025-07-01 PROCEDURE — 85027 COMPLETE CBC AUTOMATED: CPT | Performed by: INTERNAL MEDICINE

## 2025-07-01 PROCEDURE — 82947 ASSAY GLUCOSE BLOOD QUANT: CPT

## 2025-07-01 PROCEDURE — 36415 COLL VENOUS BLD VENIPUNCTURE: CPT | Performed by: INTERNAL MEDICINE

## 2025-07-01 PROCEDURE — 99232 SBSQ HOSP IP/OBS MODERATE 35: CPT | Performed by: INTERNAL MEDICINE

## 2025-07-01 PROCEDURE — 2500000004 HC RX 250 GENERAL PHARMACY W/ HCPCS (ALT 636 FOR OP/ED): Performed by: INTERNAL MEDICINE

## 2025-07-01 PROCEDURE — 1100000001 HC PRIVATE ROOM DAILY

## 2025-07-01 PROCEDURE — 2500000005 HC RX 250 GENERAL PHARMACY W/O HCPCS: Performed by: INTERNAL MEDICINE

## 2025-07-01 PROCEDURE — 2500000002 HC RX 250 W HCPCS SELF ADMINISTERED DRUGS (ALT 637 FOR MEDICARE OP, ALT 636 FOR OP/ED): Performed by: INTERNAL MEDICINE

## 2025-07-01 PROCEDURE — 2500000001 HC RX 250 WO HCPCS SELF ADMINISTERED DRUGS (ALT 637 FOR MEDICARE OP): Performed by: INTERNAL MEDICINE

## 2025-07-01 PROCEDURE — 94640 AIRWAY INHALATION TREATMENT: CPT

## 2025-07-01 RX ADMIN — Medication 2 L/MIN: at 06:59

## 2025-07-01 RX ADMIN — PANTOPRAZOLE SODIUM 40 MG: 40 TABLET, DELAYED RELEASE ORAL at 06:16

## 2025-07-01 RX ADMIN — Medication 2 L/MIN: at 18:57

## 2025-07-01 RX ADMIN — PHENOBARBITAL 97.2 MG: 32.4 TABLET ORAL at 08:19

## 2025-07-01 RX ADMIN — AZITHROMYCIN MONOHYDRATE 500 MG: 500 INJECTION, POWDER, LYOPHILIZED, FOR SOLUTION INTRAVENOUS at 13:09

## 2025-07-01 RX ADMIN — PHENOBARBITAL 97.2 MG: 32.4 TABLET ORAL at 20:39

## 2025-07-01 RX ADMIN — Medication 2 L/MIN: at 12:06

## 2025-07-01 RX ADMIN — BACLOFEN 20 MG: 10 TABLET ORAL at 06:16

## 2025-07-01 RX ADMIN — ENOXAPARIN SODIUM 40 MG: 100 INJECTION SUBCUTANEOUS at 17:24

## 2025-07-01 RX ADMIN — BACLOFEN 20 MG: 10 TABLET ORAL at 17:23

## 2025-07-01 RX ADMIN — CEFTRIAXONE 1 G: 1 INJECTION, SOLUTION INTRAVENOUS at 12:27

## 2025-07-01 RX ADMIN — BACLOFEN 20 MG: 10 TABLET ORAL at 20:39

## 2025-07-01 RX ADMIN — IPRATROPIUM BROMIDE AND ALBUTEROL SULFATE 3 ML: 2.5; .5 SOLUTION RESPIRATORY (INHALATION) at 06:59

## 2025-07-01 RX ADMIN — PHENOBARBITAL 16.2 MG: 32.4 TABLET ORAL at 20:38

## 2025-07-01 RX ADMIN — PHENOBARBITAL 16.2 MG: 32.4 TABLET ORAL at 08:19

## 2025-07-01 RX ADMIN — IPRATROPIUM BROMIDE AND ALBUTEROL SULFATE 3 ML: 2.5; .5 SOLUTION RESPIRATORY (INHALATION) at 18:57

## 2025-07-01 RX ADMIN — BACLOFEN 20 MG: 10 TABLET ORAL at 12:27

## 2025-07-01 RX ADMIN — IPRATROPIUM BROMIDE AND ALBUTEROL SULFATE 3 ML: 2.5; .5 SOLUTION RESPIRATORY (INHALATION) at 12:06

## 2025-07-01 ASSESSMENT — COGNITIVE AND FUNCTIONAL STATUS - GENERAL
WALKING IN HOSPITAL ROOM: TOTAL
MOVING TO AND FROM BED TO CHAIR: TOTAL
HELP NEEDED FOR BATHING: TOTAL
STANDING UP FROM CHAIR USING ARMS: TOTAL
DRESSING REGULAR UPPER BODY CLOTHING: TOTAL
MOBILITY SCORE: 6
CLIMB 3 TO 5 STEPS WITH RAILING: TOTAL
TOILETING: TOTAL
MOVING FROM LYING ON BACK TO SITTING ON SIDE OF FLAT BED WITH BEDRAILS: TOTAL
DRESSING REGULAR LOWER BODY CLOTHING: TOTAL
EATING MEALS: TOTAL
DAILY ACTIVITIY SCORE: 6
PERSONAL GROOMING: TOTAL
TURNING FROM BACK TO SIDE WHILE IN FLAT BAD: TOTAL

## 2025-07-01 ASSESSMENT — PAIN - FUNCTIONAL ASSESSMENT
PAIN_FUNCTIONAL_ASSESSMENT: WONG-BAKER FACES
PAIN_FUNCTIONAL_ASSESSMENT: WONG-BAKER FACES

## 2025-07-01 ASSESSMENT — PAIN SCALES - WONG BAKER
WONGBAKER_NUMERICALRESPONSE: NO HURT

## 2025-07-01 NOTE — PROGRESS NOTES
07/01/25 1329   Discharge Planning   Assistance Needed MD, NP, and bedside RN given info for MD to MD report prior to dc and RN given nurses station number for report.   Care Facility Name [facility] [phone] nurse station  MUST CALL  to  report prior to DC:  Dr eKlly Delgadillo [phone]

## 2025-07-01 NOTE — H&P
Cory Cobian is a 34 y.o. male   Shortness of Breath       Patient with a past medical history of profound intellectual disability seizure disorder spastic quadriplegia cerebral palsy recurrent admissions to the hospital presents with symptoms of shortness of breath with workup showing pneumonia  Started on antibiotics      Past Medical History  Medical History[1]    Surgical History  Surgical History[2]     Social History  He reports that he has never smoked. He has never used smokeless tobacco. He reports that he does not drink alcohol and does not use drugs.    Family History  Family History[3]     Allergies  Patient has no known allergies.    Review of Systems   Respiratory:  Positive for shortness of breath.             Vitals:    06/30/25 1954   BP:    Pulse:    Resp:    Temp:    SpO2: 98%        Scheduled medications  Scheduled Medications[4]  Continuous medications  Continuous Medications[5]  PRN medications  PRN Medications[6]    Results from last 7 days   Lab Units 06/30/25  1138   WBC AUTO x10*3/uL 7.1   HEMOGLOBIN g/dL 17.0   HEMATOCRIT % 53.1*   PLATELETS AUTO x10*3/uL 278     Results from last 7 days   Lab Units 06/30/25  1138   SODIUM mmol/L 138   POTASSIUM mmol/L 4.3   CHLORIDE mmol/L 96*   CO2 mmol/L 31   BUN mg/dL 10   CREATININE mg/dL 0.61   CALCIUM mg/dL 9.1   PROTEIN TOTAL g/dL 8.3*   BILIRUBIN TOTAL mg/dL 0.3   ALK PHOS U/L 148*   ALT U/L 20   AST U/L 18   GLUCOSE mg/dL 89     Results from last 7 days   Lab Units 06/30/25  1219 06/30/25  1138   TROPHS ng/L 10 13        XR chest 1 view   Final Result   1.  Mild patchy left suprahilar opacity may reflect a developing   consolidation.             MACRO:   None        Signed by: Jesse Dinh 6/30/2025 11:48 AM   Dictation workstation:   [id number]          Physical Exam      Constitutional   General appearance: Awake  Eyes   Inspection of eyes: Sclera and conjunctiva were normal.      Pulmonary   Respiratory assessment: No respiratory  distress, normal respiratory rhythm and effort.    Auscultation of Lungs: Clear bilateral breath sounds.   Cardiovascular   Auscultation of heart: Apical pulse normal, heart rate and rhythm normal, normal S1 and S2, no murmurs and no pericardial rub.    Exam for edema: No peripheral edema.   Abdomen   Abdominal Exam: No bruits, normal bowel sounds, soft, non-tender, no abdominal mass palpated.    PEG tube intact  Musculoskeletal   Contractures  Skin   Skin inspection: Normal skin color and pigmentation,  Neurologic   Nonverbal      Assessment/Plan      #Pneumonia  ?  Aspiration  Started antibiotics    #Cerebral palsy  #Spastic quadriplegia  #Dysphagia  Resume tube feeds  Nutrition consult    #Seizure disorder  Resume home medications       [1]   Past Medical History:  Diagnosis Date    Cerebral palsy     from [facility] Transfer Sheet    Dependent for mobility     Gastrostomy in place (Multi)     GERD (gastroesophageal reflux disease)     Intellectual disability     Intellectual disability     Nonverbal     Other conditions influencing health status     Pneumonia    Seizures (Multi)     Spastic quadriplegia (Multi)     From [facility] Transfer Sheet   [2]   Past Surgical History:  Procedure Laterality Date    OTHER SURGICAL HISTORY      gastrostomy    OTHER SURGICAL HISTORY      Steroid injections under anesthesia   [3]   Family History  Problem Relation Name Age of Onset    No Known Problems Mother     [4] [START ON 7/1/2025] azithromycin, 500 mg, intravenous, q24h  baclofen, 20 mg, oral, 4x daily  [START ON 7/1/2025] cefTRIAXone, 1 g, intravenous, q24h  enoxaparin, 40 mg, subcutaneous, q24h  ipratropium-albuteroL, 3 mL, nebulization, TID  [START ON 7/1/2025] pantoprazole, 40 mg, oral, Daily before breakfast   Or  [START ON 7/1/2025] pantoprazole, 40 mg, intravenous, Daily before breakfast  PHENobarbital, 16.2 mg, oral, BID  PHENobarbital, 97.2 mg, oral, BID    [5]    [6] PRN medications: acetaminophen  **OR** acetaminophen **OR** acetaminophen, bisacodyl, guaiFENesin, ipratropium-albuteroL, melatonin, ondansetron **OR** ondansetron, polyethylene glycol

## 2025-07-01 NOTE — CONSULTS
Nutrition Assessment Note  Nutrition Assessment      Reason for Assessment: Provider consult order, Enteral assessment/recommendation (TF)  Admitted for Community acquired pneumonia of left lung, unspecified part of lung. Receives EN via PEG at home to supplement his oral intake.  Chewing difficult and anorexia r/t Cerebral Palsy.  Staff from home reports when he chooses to eat, he tolerates oral intake without issues.    History:  Energy Intake: Poor < 50 %, Fair 50-75 %  Food and Nutrient History: Spoke with RN at home.  His intake varies by meal, depends on his mood. Will eat a 100% of meal he enjoys. Diet remains, chopped, ground, pureed with thin liquids. Likes to eat food on Ritz crackers. Likes yogurt.    Previously prescribed diets: Enteral nutrition order  Previous Diet / Nutrition Education / Counseling: custom EN of Fibersource HN 625ml, 575 ml water, 2 Tbsp. Beneprotein, 3/4 tsp. salt divided into 3  400ml bolus given at 8am, 12pm, 5pm. FWF 100ml pre/post bolus. Provides: 850 kcal, 46 gr PRO, 1680 ml water.    Problem List[1]     Anthropometrics:  Height: (!) 152.4 cm (5')  Weight: 51.4 kg (113 lb 5.1 oz) (June wt from group home.)  BMI (Calculated): 22.13    Weight History / % Weight Change: 2/24: 44kg, 12/5/23: 44kg.  Significant Weight Loss: No    IBW/kg (Dietitian Calculated): 48 kg   Amputation Calculations:  BMI Amputation Adjustment: No    Energy Needs:  Method for Estimating Needs: 7984-6664 @ 28-30 kcal/kg    Method for Estimating 24 Hour Protein Needs: 48-58 @ 1.0-1.2 gr/kg IBW    Total Fluid Estimated Needs in 24 Hours (mL): 1440 mL  Total Fluid Estimated Needs in 24 hours (mL/kg): 30 mL/kg  Patient on Order Fluid Restriction: No       Dietary Orders (From admission, onward)       Start     Ordered    06/30/25 2009  Enteral feeding with NPO PEG (percutaneous endoscopic gastric); 250; 3 times a day; 500 mL free water; Water; With each bolus  Diet effective now        Question Answer Comment    Tube feeding formula: Glucerna 1.5    Feeding route: PEG (percutaneous endoscopic gastric)    Tube feeding bolus (mL): 250    Tube feeding bolus frequency: 3 times a day    Free water restriction: 500 mL free water    Flush type: Water    Flush frequency: With each bolus        06/30/25 2009                 Provides: 1125 kcal, 62 gr PRO, 569 ml water    Nutrition Focused Physical Findings:  Orbital Fat Pads: Well nourished (slightly bulging fat pads)  Buccal Fat Pads: Well nourished (full, rounded cheeks)    Temporalis: Well nourished (well-defined muscle)    Edema: none    Digestive System Findings: Anorexia, Constipation  Mouth Findings: Chewing difficulty       Nutrition Diagnosis   Malnutrition Diagnosis  Patient has Malnutrition Diagnosis: No    Patient has Nutrition Diagnosis: Yes  Nutrition Diagnosis 1: Swallowing difficulty  Diagnosis Status (1): New  Related to (1): cerebral palsy  As Evidenced by (1): EN via PEG, texture modified diet.       Nutrition Interventions/Recommendations   Nutrition Prescription: Nutrition prescription for enteral nutrition  Individualized Nutrition Prescription Provided for : Modify current EN order to Jevity 1.5 @ 320ml bolus TID, 100ml water flush pre & post bolus feeding.  His usual routine is 8am, 12pm, 5pm.  consider additional FWF of 1000ml BID while not being offered oral diet.   MD to add diet if desired, Pureed wit thin liquids was tolerated PTA.  Daily MVI.    Food and/or Nutrient Delivery Interventions    Method of Enteral Nutrition Feeding Adminstration: Bolus enteral nutrition feeding  Body Position for Enteral Nutrition Administration: Sitting     Enteral Intake: Management of composition of enteral nutrition, Management of delivery rate of enteral nutrition, Management of flushing of feeding tube, Management of route of enteral nutrition, Management of schedule of enteral nutrition, Management of volume of enteral nutrition  Goal: to meet >75% estimated energy  needs    Additional Interventions: Jevity 1.5 960ml/d provides: 1440 kcal, 61 gr PRO, 730ml water    Collaboration and Referral of Nutrition Care: Collaboration by nutrition professional with other providers  Coordination of Care with Providers: Other (Comment), Provider (RN from his group [place].)    Education Documentation  No documentation found.    N/A     Nutrition Monitoring and Evaluation   Food and Nutrient Related History   Enteral and Parenteral Nutrition Intake Determination: Enteral nutrition formula/solution, Enteral nutrition intake - Tolerate TF at goal rate, Enteral nutrition intake - To meet > 75% estimated energy needs    Anthropometrics: Body Composition/Growth/Weight History  Body Weight: Body weight - Maintain stable weight    Body Weight Change: Body weight change percentage  Criteria: weekly    Biochemical Data, Medical Tests and Procedures  Electrolyte and Renal Panel: BUN, Calcium, serum, Chloride, Creatinine, Magnesium, Phosphorus, Potassium, Sodium  Criteria: as indicated    Gastrointestinal Profile: Gastroesophageal reflux monitoring  Criteria: as indicated    Glucose/Endocrine Profile: Glucose within normal limits ( mg/dL)  Criteria: as indicated    Nutritional Anemia Profile: Folate, serum, Hematocrit, Hemoglobin, Iron, serum, B12  Criteria: as indicated    Vitamin Profile: Vitamin D, 25 hydroxy, Other (Comment)  Criteria: as indicated    Nutrition Focused Physical Findings   Digestive System Finding: Abdominal distension, Constipation, Diarrhea, Nausea, Vomiting, Other (Comment), Anorexia  Criteria: daily    Mouth Finding: Chewing difficulty  Criteria: daily     Last Date of Nutrition Visit: 07/01/25  Nutrition Follow-Up Needed?: Dietitian to reassess per policy  Follow up Comment: LOBOTR            [1]   Patient Active Problem List  Diagnosis    CP (cerebral palsy), spastic, quadriplegic (Multi)    Kidney stones    Spastic hip dislocation    Seizure disorder (Multi)    Pain, hip     Functional quadriplegia (Multi)    Influenza A    Community acquired pneumonia of left lung, unspecified part of lung

## 2025-07-01 NOTE — PROGRESS NOTES
Cory Cobian is a 34 y.o. male     Positive blood cultures    Review of Systems           Vitals:    07/01/25 1206   BP:    Pulse:    Resp:    Temp:    SpO2: 95%        Scheduled medications  Scheduled Medications[1]  Continuous medications  Continuous Medications[2]  PRN medications  PRN Medications[3]    Lab Review   Results from last 7 days   Lab Units 07/01/25  0639 06/30/25  1138   WBC AUTO x10*3/uL 9.4 7.1   HEMOGLOBIN g/dL 15.7 17.0   HEMATOCRIT % 47.4 53.1*   PLATELETS AUTO x10*3/uL 275 278     Results from last 7 days   Lab Units 07/01/25  0639 06/30/25  1138   SODIUM mmol/L 138 138   POTASSIUM mmol/L 3.8 4.3   CHLORIDE mmol/L 92* 96*   CO2 mmol/L 30 31   BUN mg/dL 11 10   CREATININE mg/dL 0.53 0.61   CALCIUM mg/dL 9.0 9.1   PROTEIN TOTAL g/dL  --  8.3*   BILIRUBIN TOTAL mg/dL  --  0.3   ALK PHOS U/L  --  148*   ALT U/L  --  20   AST U/L  --  18   GLUCOSE mg/dL 88 89     Results from last 7 days   Lab Units 06/30/25  1219 06/30/25  1138   TROPHS ng/L 10 13        XR chest 1 view   Final Result   1.  Mild patchy left suprahilar opacity may reflect a developing   consolidation.             MACRO:   None        Signed by: Jesse Dinh 6/30/2025 11:48 AM   Dictation workstation:   [id number]            Physical Exam    Constitutional   General appearance: Awake  Pulmonary   Respiratory assessment: No respiratory distress, normal respiratory rhythm and effort.    Auscultation of Lungs: Clear bilateral breath sounds.   Cardiovascular   Auscultation of heart: Apical pulse normal, heart rate and rhythm normal, normal S1 and S2, no murmurs and no pericardial rub.    Exam for edema: No peripheral edema.   Abdomen   Abdominal Exam: No bruits, normal bowel sounds, soft, non-tender, no abdominal mass palpated.    PEG intact  Musculoskeletal   Contractures  Neurologic   Awake        Assessment/Plan      #Pneumonia  ?  Aspiration  Continue antibiotics    #Bacteremia  Awaiting sensitivity  Will get ID on board      #Cerebral palsy  #Spastic quadriplegia  #Dysphagia  Continue tube feeds  Nutrition support  Skin care     #Seizure disorder  Resume home medications       [1] azithromycin, 500 mg, intravenous, q24h  baclofen, 20 mg, oral, 4x daily  cefTRIAXone, 1 g, intravenous, q24h  enoxaparin, 40 mg, subcutaneous, q24h  ipratropium-albuteroL, 3 mL, nebulization, TID  pantoprazole, 40 mg, oral, Daily before breakfast   Or  pantoprazole, 40 mg, intravenous, Daily before breakfast  PHENobarbital, 16.2 mg, oral, BID  PHENobarbital, 97.2 mg, oral, BID    [2]    [3] PRN medications: acetaminophen **OR** acetaminophen **OR** acetaminophen, bisacodyl, guaiFENesin, ipratropium-albuteroL, melatonin, ondansetron **OR** ondansetron, oxygen, polyethylene glycol

## 2025-07-02 LAB
ANION GAP SERPL CALC-SCNC: 16 MMOL/L (ref 10–20)
BUN SERPL-MCNC: 10 MG/DL (ref 6–23)
CALCIUM SERPL-MCNC: 8.9 MG/DL (ref 8.6–10.3)
CHLORIDE SERPL-SCNC: 96 MMOL/L (ref 98–107)
CO2 SERPL-SCNC: 33 MMOL/L (ref 21–32)
CREAT SERPL-MCNC: 0.6 MG/DL (ref 0.5–1.3)
EGFRCR SERPLBLD CKD-EPI 2021: >90 ML/MIN/1.73M*2
ERYTHROCYTE [DISTWIDTH] IN BLOOD BY AUTOMATED COUNT: 11.9 % (ref 11.5–14.5)
GLUCOSE SERPL-MCNC: 85 MG/DL (ref 74–99)
HCT VFR BLD AUTO: 49.6 % (ref 41–52)
HGB BLD-MCNC: 16.4 G/DL (ref 13.5–17.5)
HOLD SPECIMEN: NORMAL
MCH RBC QN AUTO: 32 PG (ref 26–34)
MCHC RBC AUTO-ENTMCNC: 33.1 G/DL (ref 32–36)
MCV RBC AUTO: 97 FL (ref 80–100)
NRBC BLD-RTO: 0 /100 WBCS (ref 0–0)
PLATELET # BLD AUTO: 264 X10*3/UL (ref 150–450)
POTASSIUM SERPL-SCNC: 4.3 MMOL/L (ref 3.5–5.3)
RBC # BLD AUTO: 5.12 X10*6/UL (ref 4.5–5.9)
SODIUM SERPL-SCNC: 141 MMOL/L (ref 136–145)
WBC # BLD AUTO: 10.1 X10*3/UL (ref 4.4–11.3)

## 2025-07-02 PROCEDURE — 2500000002 HC RX 250 W HCPCS SELF ADMINISTERED DRUGS (ALT 637 FOR MEDICARE OP, ALT 636 FOR OP/ED): Performed by: INTERNAL MEDICINE

## 2025-07-02 PROCEDURE — 85027 COMPLETE CBC AUTOMATED: CPT | Performed by: INTERNAL MEDICINE

## 2025-07-02 PROCEDURE — 1100000001 HC PRIVATE ROOM DAILY

## 2025-07-02 PROCEDURE — 99232 SBSQ HOSP IP/OBS MODERATE 35: CPT | Performed by: INTERNAL MEDICINE

## 2025-07-02 PROCEDURE — 80048 BASIC METABOLIC PNL TOTAL CA: CPT | Performed by: INTERNAL MEDICINE

## 2025-07-02 PROCEDURE — 2500000004 HC RX 250 GENERAL PHARMACY W/ HCPCS (ALT 636 FOR OP/ED): Performed by: INTERNAL MEDICINE

## 2025-07-02 PROCEDURE — 2500000005 HC RX 250 GENERAL PHARMACY W/O HCPCS: Performed by: INTERNAL MEDICINE

## 2025-07-02 PROCEDURE — 2500000001 HC RX 250 WO HCPCS SELF ADMINISTERED DRUGS (ALT 637 FOR MEDICARE OP): Performed by: INTERNAL MEDICINE

## 2025-07-02 PROCEDURE — 82374 ASSAY BLOOD CARBON DIOXIDE: CPT | Performed by: INTERNAL MEDICINE

## 2025-07-02 PROCEDURE — 94640 AIRWAY INHALATION TREATMENT: CPT

## 2025-07-02 PROCEDURE — 36415 COLL VENOUS BLD VENIPUNCTURE: CPT | Performed by: INTERNAL MEDICINE

## 2025-07-02 RX ORDER — ESOMEPRAZOLE MAGNESIUM 40 MG/1
40 GRANULE, DELAYED RELEASE ORAL
Status: DISCONTINUED | OUTPATIENT
Start: 2025-07-03 | End: 2025-07-03 | Stop reason: HOSPADM

## 2025-07-02 RX ADMIN — BACLOFEN 20 MG: 10 TABLET ORAL at 16:33

## 2025-07-02 RX ADMIN — BACLOFEN 20 MG: 10 TABLET ORAL at 12:27

## 2025-07-02 RX ADMIN — IPRATROPIUM BROMIDE AND ALBUTEROL SULFATE 3 ML: 2.5; .5 SOLUTION RESPIRATORY (INHALATION) at 07:20

## 2025-07-02 RX ADMIN — PHENOBARBITAL 97.2 MG: 32.4 TABLET ORAL at 10:04

## 2025-07-02 RX ADMIN — IPRATROPIUM BROMIDE AND ALBUTEROL SULFATE 3 ML: 2.5; .5 SOLUTION RESPIRATORY (INHALATION) at 19:23

## 2025-07-02 RX ADMIN — Medication 1 L/MIN: at 07:20

## 2025-07-02 RX ADMIN — AZITHROMYCIN MONOHYDRATE 500 MG: 500 INJECTION, POWDER, LYOPHILIZED, FOR SOLUTION INTRAVENOUS at 13:29

## 2025-07-02 RX ADMIN — ENOXAPARIN SODIUM 40 MG: 100 INJECTION SUBCUTANEOUS at 16:46

## 2025-07-02 RX ADMIN — PHENOBARBITAL 16.2 MG: 32.4 TABLET ORAL at 10:04

## 2025-07-02 RX ADMIN — PHENOBARBITAL 16.2 MG: 32.4 TABLET ORAL at 20:26

## 2025-07-02 RX ADMIN — CEFTRIAXONE 1 G: 1 INJECTION, SOLUTION INTRAVENOUS at 12:26

## 2025-07-02 RX ADMIN — Medication 1 L/MIN: at 12:02

## 2025-07-02 RX ADMIN — IPRATROPIUM BROMIDE AND ALBUTEROL SULFATE 3 ML: 2.5; .5 SOLUTION RESPIRATORY (INHALATION) at 12:02

## 2025-07-02 RX ADMIN — BACLOFEN 20 MG: 10 TABLET ORAL at 20:26

## 2025-07-02 RX ADMIN — BACLOFEN 20 MG: 10 TABLET ORAL at 06:20

## 2025-07-02 RX ADMIN — PHENOBARBITAL 97.2 MG: 32.4 TABLET ORAL at 20:26

## 2025-07-02 RX ADMIN — PANTOPRAZOLE SODIUM 40 MG: 40 TABLET, DELAYED RELEASE ORAL at 06:20

## 2025-07-02 ASSESSMENT — COGNITIVE AND FUNCTIONAL STATUS - GENERAL
DAILY ACTIVITIY SCORE: 6
TOILETING: TOTAL
MOVING FROM LYING ON BACK TO SITTING ON SIDE OF FLAT BED WITH BEDRAILS: TOTAL
TOILETING: TOTAL
MOVING TO AND FROM BED TO CHAIR: TOTAL
CLIMB 3 TO 5 STEPS WITH RAILING: TOTAL
DRESSING REGULAR UPPER BODY CLOTHING: TOTAL
DRESSING REGULAR UPPER BODY CLOTHING: TOTAL
STANDING UP FROM CHAIR USING ARMS: TOTAL
MOVING TO AND FROM BED TO CHAIR: TOTAL
HELP NEEDED FOR BATHING: TOTAL
PERSONAL GROOMING: TOTAL
TURNING FROM BACK TO SIDE WHILE IN FLAT BAD: TOTAL
TURNING FROM BACK TO SIDE WHILE IN FLAT BAD: TOTAL
CLIMB 3 TO 5 STEPS WITH RAILING: TOTAL
WALKING IN HOSPITAL ROOM: TOTAL
DRESSING REGULAR LOWER BODY CLOTHING: TOTAL
DAILY ACTIVITIY SCORE: 6
MOBILITY SCORE: 6
PERSONAL GROOMING: TOTAL
MOVING FROM LYING ON BACK TO SITTING ON SIDE OF FLAT BED WITH BEDRAILS: TOTAL
DRESSING REGULAR LOWER BODY CLOTHING: TOTAL
EATING MEALS: TOTAL
WALKING IN HOSPITAL ROOM: TOTAL
HELP NEEDED FOR BATHING: TOTAL
MOBILITY SCORE: 6
STANDING UP FROM CHAIR USING ARMS: TOTAL
EATING MEALS: TOTAL

## 2025-07-02 ASSESSMENT — PAIN SCALES - WONG BAKER: WONGBAKER_NUMERICALRESPONSE: NO HURT

## 2025-07-02 NOTE — PROGRESS NOTES
07/02/25 1042   Discharge Planning   Assistance Needed ID seeing for bacteremia- per note Possible skin contamination of cultures - will see what these speciate to. IV CTX/Azithro for PNA. TCC to continue to follow.   Care Facility Name [facility] home [phone] nurse station  MUST CALL DR bob DEGROOT report prior to DC:  Dr Kelly Delgadillo [phone]

## 2025-07-02 NOTE — PROGRESS NOTES
Cory Cobian is a 34 y.o. male     Unchanged    Review of Systems           Vitals:    07/02/25 0805   BP: 122/86   Pulse: 96   Resp: 18   Temp: 37 °C (98.6 °F)   SpO2: 95%        Scheduled medications  Scheduled Medications[1]  Continuous medications  Continuous Medications[2]  PRN medications  PRN Medications[3]    Lab Review   Results from last 7 days   Lab Units 07/02/25  0522 07/01/25  0639 06/30/25  1138   WBC AUTO x10*3/uL 10.1 9.4 7.1   HEMOGLOBIN g/dL 16.4 15.7 17.0   HEMATOCRIT % 49.6 47.4 53.1*   PLATELETS AUTO x10*3/uL 264 275 278     Results from last 7 days   Lab Units 07/02/25  0522 07/01/25  0639 06/30/25  1138   SODIUM mmol/L 141 138 138   POTASSIUM mmol/L 4.3 3.8 4.3   CHLORIDE mmol/L 96* 92* 96*   CO2 mmol/L 33* 30 31   BUN mg/dL 10 11 10   CREATININE mg/dL 0.60 0.53 0.61   CALCIUM mg/dL 8.9 9.0 9.1   PROTEIN TOTAL g/dL  --   --  8.3*   BILIRUBIN TOTAL mg/dL  --   --  0.3   ALK PHOS U/L  --   --  148*   ALT U/L  --   --  20   AST U/L  --   --  18   GLUCOSE mg/dL 85 88 89     Results from last 7 days   Lab Units 06/30/25  1219 06/30/25  1138   TROPHS ng/L 10 13        XR chest 1 view   Final Result   1.  Mild patchy left suprahilar opacity may reflect a developing   consolidation.             MACRO:   None        Signed by: Jesse Dinh 6/30/2025 11:48 AM   Dictation workstation:   [id number]            Physical Exam    Constitutional   General appearance: Awake  Pulmonary   Respiratory assessment: No respiratory distress, normal respiratory rhythm and effort.    Auscultation of Lungs: Clear bilateral breath sounds.   Cardiovascular   Auscultation of heart: Apical pulse normal, heart rate and rhythm normal, normal S1 and S2, no murmurs and no pericardial rub.    Exam for edema: No peripheral edema.   Abdomen   Abdominal Exam: No bruits, normal bowel sounds, soft, non-tender, no abdominal mass palpated.    PEG intact  Musculoskeletal   Contractures  Neurologic   Awake        Assessment/Plan       #Pneumonia  ?  Aspiration  Continue antibiotics    #Bacteremia  Awaiting sensitivity  ID input noted     #Cerebral palsy  #Spastic quadriplegia  #Dysphagia  Continue tube feeds  Nutrition support  Skin care     #Seizure disorder  Resume home medications         [1] azithromycin, 500 mg, intravenous, q24h  baclofen, 20 mg, oral, 4x daily  cefTRIAXone, 1 g, intravenous, q24h  enoxaparin, 40 mg, subcutaneous, q24h  ipratropium-albuteroL, 3 mL, nebulization, TID  pantoprazole, 40 mg, oral, Daily before breakfast  PHENobarbital, 16.2 mg, oral, BID  PHENobarbital, 97.2 mg, oral, BID     [2]    [3] PRN medications: acetaminophen **OR** acetaminophen **OR** acetaminophen, bisacodyl, guaiFENesin, ipratropium-albuteroL, melatonin, ondansetron **OR** ondansetron, oxygen, polyethylene glycol

## 2025-07-02 NOTE — CONSULTS
INFECTIOUS DISEASE INPATIENT INITIAL CONSULTATION    Referred By: Junior Davis    Reason For Consult: bactermeia     HPI:  This is a 34 y.o. male with PMH of intellectual disability, seizure d/o, spastic quadriplegia, cerebral palsy who presented with shortness of breath.    He cannot provide any history due to baseline medical condition. There are no family members present.    Tmax 99.6 here. WBC normal. Blood cx x2 from 7/1 have GPC clusters in 1/2 vials from each set. Has been on IV CTX/Azithro for findings of left lung PNA.      Allergies:  Patient has no known allergies.     Vitals (Last 24 Hours):  Heart Rate:  []   Temp:  [36.2 °C (97.2 °F)-37.6 °C (99.6 °F)]   Resp:  [17-18]   BP: (120-152)/(77-97)   Height:  [152.4 cm (5')]   Weight:  [51.4 kg (113 lb 5.1 oz)]   SpO2:  [94 %-98 %]      PHYSICAL EXAM:  Gen - NAD, in bed, 2L NC  Heart - RRR  Lungs - limited examination with poor auscultation and arms contracted over chest  Abd - soft, no ttp, BS present  Skin - no rash    MEDS:  Current Medications[1]     LABS:  Lab Results   Component Value Date    WBC 10.1 07/02/2025    HGB 16.4 07/02/2025    HCT 49.6 07/02/2025    MCV 97 07/02/2025     07/02/2025      Results from last 72 hours   Lab Units 07/02/25  0522   SODIUM mmol/L 141   POTASSIUM mmol/L 4.3   CHLORIDE mmol/L 96*   CO2 mmol/L 33*   BUN mg/dL 10   CREATININE mg/dL 0.60   GLUCOSE mg/dL 85   CALCIUM mg/dL 8.9   ANION GAP mmol/L 16   EGFR mL/min/1.73m*2 >90     Results from last 72 hours   Lab Units 06/30/25  1138   ALK PHOS U/L 148*   BILIRUBIN TOTAL mg/dL 0.3   PROTEIN TOTAL g/dL 8.3*   ALT U/L 20   AST U/L 18   ALBUMIN g/dL 4.6     Estimated Creatinine Clearance: 121.5 mL/min (by C-G formula based on SCr of 0.6 mg/dL).    IMAGING:  CXR 6/30  Impression:     1.  Mild patchy left suprahilar opacity may reflect a developing  consolidation.       ASSESSMENT/PLAN:    Bacteremia due to GPC Clusters  Left Lung PNA  Spastic Quadriplegia/Cerebral  Palsy/Intellectual Disability    Possible skin contamination of cultures - will see what these speciate to. IV CTX/Azithro for PNA.    Monitoring for adverse effects of abx such as rash/itching/diarrhea.    Will follow. Thanks!    Judah Moreno MD  [facility]  Office #[phone]           [1]   Current Facility-Administered Medications:     acetaminophen (Tylenol) tablet 650 mg, 650 mg, oral, q4h PRN **OR** acetaminophen (Tylenol) oral liquid 650 mg, 650 mg, oral, q4h PRN **OR** acetaminophen (Tylenol) suppository 650 mg, 650 mg, rectal, q4h PRN, Junior Davis MD    azithromycin (Zithromax) 500 mg in dextrose 5%  mL, 500 mg, intravenous, q24h, Junior Davis MD, Stopped at 07/01/25 1416    baclofen (Lioresal) tablet 20 mg, 20 mg, oral, 4x daily, Junior Davis MD, 20 mg at 07/02/25 0620    bisacodyl (Dulcolax) suppository 10 mg, 10 mg, rectal, q72h PRN, Junior Davis MD    cefTRIAXone (Rocephin) 1 g in dextrose (iso) IV 50 mL, 1 g, intravenous, q24h, Junior Davis MD, Stopped at 07/01/25 1308    enoxaparin (Lovenox) syringe 40 mg, 40 mg, subcutaneous, q24h, Junior Davis MD, 40 mg at 07/01/25 1724    guaiFENesin (Mucinex) 12 hr tablet 600 mg, 600 mg, oral, q12h PRN, Junior Davis MD    ipratropium-albuteroL (Duo-Neb) 0.5-2.5 mg/3 mL nebulizer solution 3 mL, 3 mL, nebulization, TID, Samanta Arango MD, 3 mL at 07/02/25 0720    ipratropium-albuteroL (Duo-Neb) 0.5-2.5 mg/3 mL nebulizer solution 3 mL, 3 mL, nebulization, q2h PRN, Samanta Arango MD    melatonin tablet 3 mg, 3 mg, oral, Nightly PRN, Junior Davis MD    ondansetron (Zofran) tablet 4 mg, 4 mg, oral, q8h PRN **OR** ondansetron (Zofran) injection 4 mg, 4 mg, intravenous, q8h PRN, Junior Davis MD    oxygen (O2) therapy, , inhalation, Continuous PRN - O2/gases, Junior Davis MD, 1 L/min at 07/02/25 0720    pantoprazole (ProtoNix) EC tablet 40 mg, 40 mg, oral, Daily before breakfast, 40 mg at 07/02/25 0620 **OR**  pantoprazole (Protonix) injection 40 mg, 40 mg, intravenous, Daily before breakfast, Junior Davis MD    PHENobarbital (Luminal) tablet 16.2 mg, 16.2 mg, oral, BID, Junior Davis MD, 16.2 mg at 07/01/25 2038    PHENobarbital (Luminal) tablet 97.2 mg, 97.2 mg, oral, BID, Junior Davis MD, 97.2 mg at 07/01/25 2039    polyethylene glycol (Glycolax, Miralax) packet 17 g, 17 g, oral, Daily PRN, Junior Davis MD

## 2025-07-02 NOTE — CARE PLAN
The patient's goals for the shift include      The clinical goals for the shift include Pt. will remain free from fall/injury throughout shift    Over the shift, the patient did not make progress toward the following goals. Barriers to progression include pneumonia. Recommendations to address these barriers include antibiotics.

## 2025-07-03 ENCOUNTER — PHARMACY VISIT (OUTPATIENT)
Dept: PHARMACY | Facility: CLINIC | Age: 35
End: 2025-07-03
Payer: MEDICAID

## 2025-07-03 VITALS
HEIGHT: 60 IN | RESPIRATION RATE: 17 BRPM | SYSTOLIC BLOOD PRESSURE: 135 MMHG | BODY MASS INDEX: 22.25 KG/M2 | DIASTOLIC BLOOD PRESSURE: 93 MMHG | OXYGEN SATURATION: 100 % | HEART RATE: 86 BPM | TEMPERATURE: 98.2 F | WEIGHT: 113.32 LBS

## 2025-07-03 LAB
ANION GAP SERPL CALC-SCNC: 8 MMOL/L (ref 10–20)
BUN SERPL-MCNC: 10 MG/DL (ref 6–23)
CALCIUM SERPL-MCNC: 9.4 MG/DL (ref 8.6–10.3)
CHLORIDE SERPL-SCNC: 96 MMOL/L (ref 98–107)
CO2 SERPL-SCNC: 39 MMOL/L (ref 21–32)
CREAT SERPL-MCNC: 0.53 MG/DL (ref 0.5–1.3)
EGFRCR SERPLBLD CKD-EPI 2021: >90 ML/MIN/1.73M*2
ERYTHROCYTE [DISTWIDTH] IN BLOOD BY AUTOMATED COUNT: 11.9 % (ref 11.5–14.5)
GLUCOSE SERPL-MCNC: 92 MG/DL (ref 74–99)
HCT VFR BLD AUTO: 48.7 % (ref 41–52)
HGB BLD-MCNC: 15.9 G/DL (ref 13.5–17.5)
MCH RBC QN AUTO: 32.3 PG (ref 26–34)
MCHC RBC AUTO-ENTMCNC: 32.6 G/DL (ref 32–36)
MCV RBC AUTO: 99 FL (ref 80–100)
NRBC BLD-RTO: 0 /100 WBCS (ref 0–0)
PLATELET # BLD AUTO: 231 X10*3/UL (ref 150–450)
POTASSIUM SERPL-SCNC: 3.9 MMOL/L (ref 3.5–5.3)
RBC # BLD AUTO: 4.93 X10*6/UL (ref 4.5–5.9)
SODIUM SERPL-SCNC: 139 MMOL/L (ref 136–145)
WBC # BLD AUTO: 7.8 X10*3/UL (ref 4.4–11.3)

## 2025-07-03 PROCEDURE — 2500000002 HC RX 250 W HCPCS SELF ADMINISTERED DRUGS (ALT 637 FOR MEDICARE OP, ALT 636 FOR OP/ED): Performed by: INTERNAL MEDICINE

## 2025-07-03 PROCEDURE — 80048 BASIC METABOLIC PNL TOTAL CA: CPT | Performed by: INTERNAL MEDICINE

## 2025-07-03 PROCEDURE — 2500000001 HC RX 250 WO HCPCS SELF ADMINISTERED DRUGS (ALT 637 FOR MEDICARE OP): Performed by: INTERNAL MEDICINE

## 2025-07-03 PROCEDURE — 94640 AIRWAY INHALATION TREATMENT: CPT

## 2025-07-03 PROCEDURE — 99239 HOSP IP/OBS DSCHRG MGMT >30: CPT | Performed by: INTERNAL MEDICINE

## 2025-07-03 PROCEDURE — 2500000001 HC RX 250 WO HCPCS SELF ADMINISTERED DRUGS (ALT 637 FOR MEDICARE OP): Performed by: NURSE PRACTITIONER

## 2025-07-03 PROCEDURE — RXMED WILLOW AMBULATORY MEDICATION CHARGE

## 2025-07-03 PROCEDURE — 85027 COMPLETE CBC AUTOMATED: CPT | Performed by: INTERNAL MEDICINE

## 2025-07-03 PROCEDURE — 36415 COLL VENOUS BLD VENIPUNCTURE: CPT | Performed by: INTERNAL MEDICINE

## 2025-07-03 PROCEDURE — 2500000004 HC RX 250 GENERAL PHARMACY W/ HCPCS (ALT 636 FOR OP/ED): Performed by: INTERNAL MEDICINE

## 2025-07-03 RX ORDER — AMOXICILLIN AND CLAVULANATE POTASSIUM 875; 125 MG/1; MG/1
1 TABLET, FILM COATED ORAL EVERY 12 HOURS SCHEDULED
Qty: 9 TABLET | Refills: 0 | Status: SHIPPED | OUTPATIENT
Start: 2025-07-03 | End: 2025-07-08

## 2025-07-03 RX ORDER — AMOXICILLIN AND CLAVULANATE POTASSIUM 875; 125 MG/1; MG/1
1 TABLET, FILM COATED ORAL EVERY 12 HOURS SCHEDULED
Status: DISCONTINUED | OUTPATIENT
Start: 2025-07-03 | End: 2025-07-03 | Stop reason: HOSPADM

## 2025-07-03 RX ADMIN — ESOMEPRAZOLE MAGNESIUM 40 MG: 40 FOR SUSPENSION ORAL at 06:04

## 2025-07-03 RX ADMIN — PHENOBARBITAL 97.2 MG: 32.4 TABLET ORAL at 08:39

## 2025-07-03 RX ADMIN — AMOXICILLIN AND CLAVULANATE POTASSIUM 1 TABLET: 875; 125 TABLET, FILM COATED ORAL at 10:47

## 2025-07-03 RX ADMIN — ENOXAPARIN SODIUM 40 MG: 100 INJECTION SUBCUTANEOUS at 16:20

## 2025-07-03 RX ADMIN — BACLOFEN 20 MG: 10 TABLET ORAL at 11:46

## 2025-07-03 RX ADMIN — BACLOFEN 20 MG: 10 TABLET ORAL at 16:13

## 2025-07-03 RX ADMIN — BACLOFEN 20 MG: 10 TABLET ORAL at 06:04

## 2025-07-03 RX ADMIN — IPRATROPIUM BROMIDE AND ALBUTEROL SULFATE 3 ML: 2.5; .5 SOLUTION RESPIRATORY (INHALATION) at 07:29

## 2025-07-03 RX ADMIN — PHENOBARBITAL 16.2 MG: 32.4 TABLET ORAL at 08:40

## 2025-07-03 ASSESSMENT — COGNITIVE AND FUNCTIONAL STATUS - GENERAL
DAILY ACTIVITIY SCORE: 6
MOVING TO AND FROM BED TO CHAIR: TOTAL
CLIMB 3 TO 5 STEPS WITH RAILING: TOTAL
MOVING FROM LYING ON BACK TO SITTING ON SIDE OF FLAT BED WITH BEDRAILS: TOTAL
HELP NEEDED FOR BATHING: TOTAL
STANDING UP FROM CHAIR USING ARMS: TOTAL
DRESSING REGULAR UPPER BODY CLOTHING: TOTAL
TURNING FROM BACK TO SIDE WHILE IN FLAT BAD: TOTAL
TOILETING: TOTAL
WALKING IN HOSPITAL ROOM: TOTAL
MOBILITY SCORE: 6
EATING MEALS: TOTAL
DRESSING REGULAR LOWER BODY CLOTHING: TOTAL
PERSONAL GROOMING: TOTAL

## 2025-07-03 ASSESSMENT — PAIN SCALES - WONG BAKER: WONGBAKER_NUMERICALRESPONSE: NO HURT

## 2025-07-03 ASSESSMENT — PAIN - FUNCTIONAL ASSESSMENT: PAIN_FUNCTIONAL_ASSESSMENT: WONG-BAKER FACES

## 2025-07-03 NOTE — DISCHARGE SUMMARY
Discharge Diagnosis  Community acquired pneumonia of left lung, unspecified part of lung           Issues Requiring Follow-Up  Pleat course of antibiotics  Skin care    Discharge Meds     Medication List      START taking these medications     amoxicillin-clavulanate 875-125 mg tablet; Commonly known as: Augmentin;   Take 1 tablet by g-tube every 12 hours for 9 doses.     CONTINUE taking these medications     acetaminophen 325 mg tablet; Commonly known as: Tylenol   albuterol 90 mcg/actuation inhaler   baclofen 20 mg tablet; Commonly known as: Lioresal   bisacodyl 10 mg suppository; Commonly known as: Dulcolax   calcium carbonate-vitamin D3 500 mg-5 mcg (200 unit) tablet   CENTRUM ORAL   ibuprofen 100 mg/5 mL suspension   MIRALAX ORAL   NexIUM Packet 20 mg packet; Generic drug: esomeprazole   * PHENobarbital 97.2 mg tablet; Commonly known as: Luminal   * PHENobarbital 16.2 mg tablet; Commonly known as: Luminal   simethicone 125 mg chewable tablet; Commonly known as: Mylicon   Valtoco 10 mg/spray (0.1 mL) spray,non-aerosol nasal spray; Generic   drug: diazePAM  * This list has 2 medication(s) that are the same as other medications   prescribed for you. Read the directions carefully, and ask your doctor or   other care provider to review them with you.       Test Results Pending At Discharge  Pending Labs       Order Current Status    Blood Culture Preliminary result    Blood Culture Preliminary result            Hospital Course   Patient with a past medical history of profound intellectual disability seizure disorder spastic quadriplegia cerebral palsy recurrent admissions to the hospital presents with symptoms of shortness of breath with workup showing pneumonia  Started on antibiotics  Patient's breathing remained stable during the stay and he did not use oxygen  Did had positive blood cultures but there were contaminants  We switch the patient to oral Augmentin for 5 more days discharging back to group home in  stable condition      Pertinent Physical Exam At Time of Discharge  Physical Exam    Constitutional   General appearance: Awake    Pulmonary   Respiratory assessment: No respiratory distress, normal respiratory rhythm and effort.    Auscultation of Lungs: Clear bilateral breath sounds.   Cardiovascular   Auscultation of heart: Apical pulse normal, heart rate and rhythm normal, normal S1 and S2, no murmurs and no pericardial rub.    Exam for edema: No peripheral edema.   Abdomen   Abdominal Exam: No bruits, normal bowel sounds, soft, non-tender, no abdominal mass palpated.    Liver and Spleen exam: No hepato-splenomegaly.   PEG tube intact  Contractures  Skin   Skin inspection: Normal skin color and pigmentation, normal skin turgor and no visible rash.   Neurologic   Contractures    Outpatient Follow-Up  Future Appointments   Date Time Provider Department Center   12/19/2025 11:00 AM Ever Gimenez MD [id number]     Patient seen at bedside. Events from the last visit reviewed. Discussed with staff. Results of tests and investigations from last visit reviewed and discussed with patient/Family. Electronic chart on [facility] reviewed. Input / Recommendations  from consultants  appreciated and reviewed and agreed with.     discharge summary and profile completed. medications reviewed and discussed with patient and family.  scripts completed and signed.     total discharge time in excess of 30 minutes.      Junior Davis MD

## 2025-07-03 NOTE — CARE PLAN
The patient's goals for the shift include      The clinical goals for the shift include pt remain hds and rest

## 2025-07-03 NOTE — PROGRESS NOTES
INFECTIOUS DISEASE DAILY PROGRESS NOTE    SUBJECTIVE:    No overnight events. No new complaints. Afebrile. No rash/itching/diarrhea.    OBJECTIVE:  VITALS (Last 24 Hours)  BP (!) 151/103 (BP Location: Left arm, Patient Position: Lying)   Pulse 109   Temp 36.8 °C (98.3 °F) (Temporal)   Resp 18   Ht (!) 1.524 m (5')   Wt 51.4 kg (113 lb 5.1 oz) Comment: June wt from group home.  SpO2 93%   BMI 22.13 kg/m²     PHYSICAL EXAM:  Gen - not on O2 support his morning.  Heart - RRR  Lungs - limited examination with poor auscultation and arms contracted over chest  Abd - soft, PEG tube site looks fine  Skin - no rashes     ABX: IV CTX/Azithro    LABS:  Lab Results   Component Value Date    WBC 7.8 07/03/2025    HGB 15.9 07/03/2025    HCT 48.7 07/03/2025    MCV 99 07/03/2025     07/03/2025     Lab Results   Component Value Date    GLUCOSE 92 07/03/2025    CALCIUM 9.4 07/03/2025     07/03/2025    K 3.9 07/03/2025    CO2 39 (H) 07/03/2025    CL 96 (L) 07/03/2025    BUN 10 07/03/2025    CREATININE 0.53 07/03/2025     Results from last 72 hours   Lab Units 06/30/25  1138   ALK PHOS U/L 148*   BILIRUBIN TOTAL mg/dL 0.3   PROTEIN TOTAL g/dL 8.3*   ALT U/L 20   AST U/L 18   ALBUMIN g/dL 4.6     Estimated Creatinine Clearance: 125 mL/min (by C-G formula based on SCr of 0.53 mg/dL).    ASSESSMENT/PLAN:     Bacteremia due to Staph epi and Staph capitis - 2 different species of CoNS isolated from blood samples like this is consistent with skin contamination. These organisms due not cause PNA in a case like this and are not related to that infection.  Left Lung PNA  Spastic Quadriplegia/Cerebral Palsy/Intellectual Disability    OK to go home with Augmentin 875mg BID via PEG tube for 5 more days.      Monitoring for adverse effects of abx such as rash/itching/diarrhea - none.    Will sign off. Please call back with questions. Thanks!    Judah Moreno MD  [facility]  Office #[phone]

## 2025-07-03 NOTE — PROGRESS NOTES
Cory Cobian is a 34 y.o. male on day 3 of admission presenting with Community acquired pneumonia of left lung, unspecified part of lung.    Plan: Continues treatment for LL PNA receiving IV abx, ID following cultures.   Disposition: [facility]  Barrier: cultures, ID recs  ADOD:  1-2 days    1209pm  Called to updated [facility] nurse that patient will return to them this afternoon. Will need transport arranged for 3pm so patient is able to return by 6pm. Gave report number to NP to call  To DR. Lion. Will have transport arranged once DC orders are placed. Need Meds to beds delivered prior to DC.     1300pm  Transport arranged for 2:30pm. Updated care team.      07/03/25 0932   Discharge Planning   Support Systems Parent;Home care staff   Care Facility Name [facility] [phone] nurse station  MUST CALL DR bob lion prior to DC:  Dr Kelly Delgadillo [phone]   Home or Post Acute Services In home services   Type of Home Care Services Home health aide   Expected Discharge Disposition Home  ([facility])   Does the patient need discharge transport arranged? Yes   [facility] coordination needed? Yes   Has discharge transport been arranged? No   What day is the transport expected? 07/05/25   Intensity of Service   Intensity of Service 0-30 min       Sunni Hassan RN

## 2025-07-03 NOTE — CARE PLAN
The patient's goals for the shift include  mitch    The clinical goals for the shift include pt remain hds and rest    Problem: Skin  Goal: Decreased wound size/increased tissue granulation at next dressing change  Flowsheets (Taken 7/2/2025 2206)  Decreased wound size/increased tissue granulation at next dressing change: Promote sleep for wound healing  Goal: Participates in plan/prevention/treatment measures  Flowsheets (Taken 7/2/2025 2206)  Participates in plan/prevention/treatment measures:   Discuss with provider PT/OT consult   Elevate heels  Goal: Prevent/manage excess moisture  Flowsheets (Taken 7/2/2025 2206)  Prevent/manage excess moisture:   Cleanse incontinence/protect with barrier cream   Moisturize dry skin  Goal: Prevent/minimize sheer/friction injuries  Flowsheets (Taken 7/2/2025 2206)  Prevent/minimize sheer/friction injuries:   Use pull sheet   HOB 30 degrees or less   Turn/reposition every 2 hours/use positioning/transfer devices  Goal: Promote/optimize nutrition  Flowsheets (Taken 7/2/2025 2206)  Promote/optimize nutrition: Monitor/record intake including meals  Goal: Promote skin healing  Flowsheets (Taken 7/2/2025 2206)  Promote skin healing:   Turn/reposition every 2 hours/use positioning/transfer devices   Protective dressings over bony prominences

## 2025-07-05 LAB
BACTERIA BLD AEROBE CULT: ABNORMAL
BACTERIA BLD CULT: ABNORMAL
GRAM STN SPEC: ABNORMAL
GRAM STN SPEC: ABNORMAL

## 2025-07-06 LAB
ATRIAL RATE: 109 BPM
P AXIS: 48 DEGREES
P OFFSET: 207 MS
P ONSET: 161 MS
PR INTERVAL: 130 MS
Q ONSET: 226 MS
QRS COUNT: 18 BEATS
QRS DURATION: 66 MS
QT INTERVAL: 318 MS
QTC CALCULATION(BAZETT): 428 MS
QTC FREDERICIA: 387 MS
R AXIS: 44 DEGREES
T AXIS: 62 DEGREES
T OFFSET: 385 MS
VENTRICULAR RATE: 109 BPM

## 2025-07-15 ENCOUNTER — LAB REQUISITION (OUTPATIENT)
Dept: LAB | Facility: HOSPITAL | Age: 35
End: 2025-07-15
Payer: MEDICAID

## 2025-07-15 DIAGNOSIS — Z51.81 ENCOUNTER FOR THERAPEUTIC DRUG LEVEL MONITORING: ICD-10-CM

## 2025-07-15 DIAGNOSIS — Z79.899 OTHER LONG TERM (CURRENT) DRUG THERAPY: ICD-10-CM

## 2025-07-15 DIAGNOSIS — E55.9 VITAMIN D DEFICIENCY, UNSPECIFIED: ICD-10-CM

## 2025-07-15 LAB
25(OH)D3 SERPL-MCNC: 38 NG/ML (ref 30–100)
ALBUMIN SERPL BCP-MCNC: 4.2 G/DL (ref 3.4–5)
ALP SERPL-CCNC: 126 U/L (ref 33–120)
ALT SERPL W P-5'-P-CCNC: 24 U/L (ref 10–52)
ANION GAP SERPL CALC-SCNC: 10 MMOL/L (ref 10–20)
AST SERPL W P-5'-P-CCNC: 16 U/L (ref 9–39)
BILIRUB DIRECT SERPL-MCNC: 0.1 MG/DL (ref 0–0.3)
BILIRUB SERPL-MCNC: 0.3 MG/DL (ref 0–1.2)
BUN SERPL-MCNC: 8 MG/DL (ref 6–23)
CALCIUM SERPL-MCNC: 9.1 MG/DL (ref 8.6–10.3)
CHLORIDE SERPL-SCNC: 98 MMOL/L (ref 98–107)
CO2 SERPL-SCNC: 34 MMOL/L (ref 21–32)
CREAT SERPL-MCNC: 0.58 MG/DL (ref 0.5–1.3)
CRP SERPL-MCNC: 0.22 MG/DL
EGFRCR SERPLBLD CKD-EPI 2021: >90 ML/MIN/1.73M*2
FERRITIN SERPL-MCNC: 50 NG/ML (ref 20–300)
GLUCOSE SERPL-MCNC: 75 MG/DL (ref 74–99)
IRON SATN MFR SERPL: 38 % (ref 25–45)
IRON SERPL-MCNC: 128 UG/DL (ref 35–150)
MAGNESIUM SERPL-MCNC: 1.96 MG/DL (ref 1.6–2.4)
PHENOBARB SERPL-MCNC: 36.1 UG/ML (ref 10–40)
PHOSPHATE SERPL-MCNC: 4.5 MG/DL (ref 2.5–4.9)
POTASSIUM SERPL-SCNC: 4.3 MMOL/L (ref 3.5–5.3)
PROT SERPL-MCNC: 7.3 G/DL (ref 6.4–8.2)
SODIUM SERPL-SCNC: 138 MMOL/L (ref 136–145)
TIBC SERPL-MCNC: 335 UG/DL (ref 240–445)
TRANSFERRIN SERPL-MCNC: 248 MG/DL (ref 200–360)
UIBC SERPL-MCNC: 207 UG/DL (ref 110–370)

## 2025-07-15 PROCEDURE — 83550 IRON BINDING TEST: CPT | Mod: CCI,OUT | Performed by: HOSPITALIST

## 2025-07-15 PROCEDURE — 83735 ASSAY OF MAGNESIUM: CPT | Mod: OUT | Performed by: HOSPITALIST

## 2025-07-15 PROCEDURE — 82728 ASSAY OF FERRITIN: CPT | Mod: OUT | Performed by: HOSPITALIST

## 2025-07-15 PROCEDURE — 84100 ASSAY OF PHOSPHORUS: CPT | Mod: OUT | Performed by: HOSPITALIST

## 2025-07-15 PROCEDURE — 80184 ASSAY OF PHENOBARBITAL: CPT | Mod: OUT | Performed by: HOSPITALIST

## 2025-07-15 PROCEDURE — 80053 COMPREHEN METABOLIC PANEL: CPT | Mod: OUT | Performed by: HOSPITALIST

## 2025-07-15 PROCEDURE — 82306 VITAMIN D 25 HYDROXY: CPT | Mod: OUT,PORLAB | Performed by: HOSPITALIST

## 2025-07-15 PROCEDURE — 82248 BILIRUBIN DIRECT: CPT | Mod: OUT | Performed by: HOSPITALIST

## 2025-07-15 PROCEDURE — 84466 ASSAY OF TRANSFERRIN: CPT | Mod: OUT,PORLAB | Performed by: HOSPITALIST

## 2025-07-15 PROCEDURE — 86140 C-REACTIVE PROTEIN: CPT | Mod: OUT | Performed by: HOSPITALIST

## 2025-07-15 PROCEDURE — 36415 COLL VENOUS BLD VENIPUNCTURE: CPT | Mod: OUT | Performed by: HOSPITALIST

## 2025-07-25 RX ORDER — AMLODIPINE BESYLATE 5 MG/1
5 TABLET ORAL DAILY
COMMUNITY

## 2025-08-07 ENCOUNTER — ANESTHESIA EVENT (OUTPATIENT)
Dept: OPERATING ROOM | Facility: CLINIC | Age: 35
End: 2025-08-07

## 2025-08-07 RX ORDER — DROPERIDOL 2.5 MG/ML
0.62 INJECTION, SOLUTION INTRAMUSCULAR; INTRAVENOUS ONCE AS NEEDED
OUTPATIENT
Start: 2025-08-07

## 2025-08-07 RX ORDER — ONDANSETRON HYDROCHLORIDE 2 MG/ML
4 INJECTION, SOLUTION INTRAVENOUS ONCE AS NEEDED
OUTPATIENT
Start: 2025-08-07

## 2025-08-07 RX ORDER — OXYCODONE HYDROCHLORIDE 5 MG/1
5 TABLET ORAL
Refills: 0 | OUTPATIENT
Start: 2025-08-07

## 2025-08-07 RX ORDER — HYDROMORPHONE HYDROCHLORIDE 1 MG/ML
0.25 INJECTION, SOLUTION INTRAMUSCULAR; INTRAVENOUS; SUBCUTANEOUS EVERY 5 MIN PRN
OUTPATIENT
Start: 2025-08-07

## 2025-08-07 RX ORDER — SODIUM CHLORIDE, SODIUM LACTATE, POTASSIUM CHLORIDE, CALCIUM CHLORIDE 600; 310; 30; 20 MG/100ML; MG/100ML; MG/100ML; MG/100ML
100 INJECTION, SOLUTION INTRAVENOUS CONTINUOUS
OUTPATIENT
Start: 2025-08-07 | End: 2025-08-07

## 2025-08-07 RX ORDER — HYDROMORPHONE HYDROCHLORIDE 1 MG/ML
0.5 INJECTION, SOLUTION INTRAMUSCULAR; INTRAVENOUS; SUBCUTANEOUS EVERY 5 MIN PRN
OUTPATIENT
Start: 2025-08-07

## 2025-08-08 ENCOUNTER — ANESTHESIA (OUTPATIENT)
Dept: OPERATING ROOM | Facility: CLINIC | Age: 35
End: 2025-08-08
Payer: MEDICAID

## 2025-08-08 ENCOUNTER — HOSPITAL ENCOUNTER (OUTPATIENT)
Facility: CLINIC | Age: 35
Setting detail: OUTPATIENT SURGERY
Discharge: HOME | End: 2025-08-08
Attending: ORTHOPAEDIC SURGERY | Admitting: ORTHOPAEDIC SURGERY
Payer: MEDICAID

## 2025-08-08 VITALS
HEART RATE: 79 BPM | RESPIRATION RATE: 16 BRPM | DIASTOLIC BLOOD PRESSURE: 97 MMHG | BODY MASS INDEX: 21.4 KG/M2 | SYSTOLIC BLOOD PRESSURE: 183 MMHG | OXYGEN SATURATION: 91 % | WEIGHT: 109.57 LBS | TEMPERATURE: 98.2 F

## 2025-08-08 ASSESSMENT — COLUMBIA-SUICIDE SEVERITY RATING SCALE - C-SSRS
1. IN THE PAST MONTH, HAVE YOU WISHED YOU WERE DEAD OR WISHED YOU COULD GO TO SLEEP AND NOT WAKE UP?: NO
2. HAVE YOU ACTUALLY HAD ANY THOUGHTS OF KILLING YOURSELF?: NO
6. HAVE YOU EVER DONE ANYTHING, STARTED TO DO ANYTHING, OR PREPARED TO DO ANYTHING TO END YOUR LIFE?: NO

## 2025-08-08 ASSESSMENT — PAIN - FUNCTIONAL ASSESSMENT: PAIN_FUNCTIONAL_ASSESSMENT: FLACC (FACE, LEGS, ACTIVITY, CRY, CONSOLABILITY)

## 2025-12-19 ENCOUNTER — APPOINTMENT (OUTPATIENT)
Dept: UROLOGY | Facility: CLINIC | Age: 35
End: 2025-12-19
Payer: MEDICAID

## (undated) DEVICE — NEEDLE, HYPODERMIC, MONOJECT, TRI-BEVELED, ANTI-CORING, 27 G X 1.25 IN, LUER LOCK HUB, YELLOW

## (undated) DEVICE — SYRINGE, 10 CC, LUER LOCK

## (undated) DEVICE — NEEDLE, SPINAL, 22 G X 3.5 IN, BLACK HUB

## (undated) DEVICE — SYRINGE, MONOJECT, LUER LOCK, 3 CC, LF

## (undated) DEVICE — NEEDLE, SPINAL, QUINCKE, 18 G X 3.5 IN, PINK HUB

## (undated) DEVICE — SYRINGE, HYPODERMIC, TB, W/O NEEDLE, 1 CC, SLIP TIP

## (undated) DEVICE — BANDAGE, ADHESIVE, STRIP, FLEXIBLE 3/4 X 3 IN, LF